# Patient Record
Sex: FEMALE | Race: WHITE | NOT HISPANIC OR LATINO | Employment: FULL TIME | RURAL
[De-identification: names, ages, dates, MRNs, and addresses within clinical notes are randomized per-mention and may not be internally consistent; named-entity substitution may affect disease eponyms.]

---

## 2017-02-28 ENCOUNTER — TRANSCRIBE ORDERS (OUTPATIENT)
Dept: FAMILY MEDICINE CLINIC | Facility: CLINIC | Age: 57
End: 2017-02-28

## 2017-02-28 DIAGNOSIS — Z12.31 VISIT FOR SCREENING MAMMOGRAM: Primary | ICD-10-CM

## 2017-03-07 ENCOUNTER — OFFICE VISIT (OUTPATIENT)
Dept: FAMILY MEDICINE CLINIC | Facility: CLINIC | Age: 57
End: 2017-03-07

## 2017-03-07 VITALS
HEART RATE: 90 BPM | HEIGHT: 64 IN | OXYGEN SATURATION: 98 % | WEIGHT: 181.4 LBS | BODY MASS INDEX: 30.97 KG/M2 | SYSTOLIC BLOOD PRESSURE: 122 MMHG | TEMPERATURE: 98.2 F | DIASTOLIC BLOOD PRESSURE: 84 MMHG

## 2017-03-07 DIAGNOSIS — J01.00 ACUTE NON-RECURRENT MAXILLARY SINUSITIS: Primary | ICD-10-CM

## 2017-03-07 DIAGNOSIS — F41.8 MIXED ANXIETY DEPRESSIVE DISORDER: ICD-10-CM

## 2017-03-07 DIAGNOSIS — E55.9 VITAMIN D DEFICIENCY: ICD-10-CM

## 2017-03-07 DIAGNOSIS — Z23 NEED FOR TDAP VACCINATION: ICD-10-CM

## 2017-03-07 PROBLEM — B00.1 HERPES LABIALIS: Status: ACTIVE | Noted: 2017-03-07

## 2017-03-07 PROBLEM — R53.83 FATIGUE: Status: ACTIVE | Noted: 2017-03-07

## 2017-03-07 PROBLEM — M54.50 LOW BACK PAIN: Status: ACTIVE | Noted: 2017-03-07

## 2017-03-07 PROBLEM — N64.4 BREAST PAIN: Status: ACTIVE | Noted: 2017-03-07

## 2017-03-07 PROBLEM — R07.2 PRECORDIAL PAIN: Status: ACTIVE | Noted: 2017-03-07

## 2017-03-07 PROCEDURE — 99213 OFFICE O/P EST LOW 20 MIN: CPT | Performed by: NURSE PRACTITIONER

## 2017-03-07 RX ORDER — ERGOCALCIFEROL 1.25 MG/1
CAPSULE ORAL
COMMUNITY
Start: 2015-07-22 | End: 2017-03-07

## 2017-03-07 RX ORDER — AMOXICILLIN AND CLAVULANATE POTASSIUM 875; 125 MG/1; MG/1
1 TABLET, FILM COATED ORAL 2 TIMES DAILY
Qty: 20 TABLET | Refills: 0 | Status: SHIPPED | OUTPATIENT
Start: 2017-03-07 | End: 2017-05-23

## 2017-03-07 RX ORDER — IBUPROFEN 800 MG/1
800 TABLET ORAL EVERY 6 HOURS PRN
COMMUNITY
Start: 2016-12-23 | End: 2017-09-14

## 2017-03-07 RX ORDER — FLUCONAZOLE 150 MG/1
150 TABLET ORAL ONCE
Qty: 2 TABLET | Refills: 0 | Status: SHIPPED | OUTPATIENT
Start: 2017-03-07 | End: 2017-03-07

## 2017-03-07 NOTE — PROGRESS NOTES
"Joe Keating is a 56 y.o. female.   Chief Complaint   Patient presents with   • URI     URI    This is a new problem. The current episode started 1 to 4 weeks ago. The problem has been gradually worsening. Associated symptoms include congestion, coughing, headaches, nausea, rhinorrhea and sinus pain. Pertinent negatives include no abdominal pain, chest pain, diarrhea, ear pain, rash, sneezing, sore throat, swollen glands, vomiting or wheezing. She has tried nothing for the symptoms. The treatment provided no relief.        The following portions of the patient's history were reviewed and updated as appropriate: allergies, current medications, past family history, past medical history, past social history, past surgical history and problem list.  Visit Vitals   • /84 (BP Location: Right arm, Patient Position: Sitting)   • Pulse 90   • Temp 98.2 °F (36.8 °C) (Oral)   • Ht 64\" (162.6 cm)   • Wt 181 lb 6.4 oz (82.3 kg)   • SpO2 98%  Comment: Room air   • BMI 31.14 kg/m2     Review of Systems   Constitutional: Negative for chills, fatigue and fever.   HENT: Positive for congestion and rhinorrhea. Negative for ear pain, sneezing and sore throat.    Respiratory: Positive for cough. Negative for shortness of breath and wheezing.    Cardiovascular: Negative for chest pain, palpitations and leg swelling.   Gastrointestinal: Positive for nausea. Negative for abdominal pain, diarrhea and vomiting.   Musculoskeletal: Negative for back pain and myalgias.   Skin: Negative for rash and wound.   Neurological: Positive for headaches. Negative for dizziness and light-headedness.   Psychiatric/Behavioral: Negative for sleep disturbance. The patient is not nervous/anxious.        Objective   Physical Exam   Constitutional: She is oriented to person, place, and time. She appears well-developed and well-nourished.   HENT:   Head: Normocephalic and atraumatic.   Right Ear: External ear normal.   Left Ear: External ear " normal.   Oropharynx PND noted  Nasal turbinates erythematous and edematous   Eyes: Pupils are equal, round, and reactive to light.   Cardiovascular: Normal rate, regular rhythm and normal heart sounds.    Pulmonary/Chest: Effort normal and breath sounds normal.   Abdominal: Soft. Bowel sounds are normal.   Musculoskeletal: Normal range of motion.   Neurological: She is alert and oriented to person, place, and time.   Skin: Skin is warm and dry.   Psychiatric: She has a normal mood and affect. Her behavior is normal.       Assessment/Plan   Zuleyma was seen today for uri.    Diagnoses and all orders for this visit:    Acute non-recurrent maxillary sinusitis  -     amoxicillin-clavulanate (AUGMENTIN) 875-125 MG per tablet; Take 1 tablet by mouth 2 (Two) Times a Day.  -     fluconazole (DIFLUCAN) 150 MG tablet; Take 1 tablet by mouth 1 (One) Time for 1 dose.      Symptoms have persisted 2 weeks despite supportive treatment. Will order Augmentin, administration and SE discussed. She does typically develop vaginitis with antibiotic use. Will order Diflucan. Eat a yogurt daily with medication. Stay hydrated. Follow up in 2-3 days if no improvement or if symptoms worsen.  I have also encouraged her to return for fasting labs and Tdap vaccine in the near future. She voices understanding.

## 2017-03-13 ENCOUNTER — APPOINTMENT (OUTPATIENT)
Dept: MAMMOGRAPHY | Facility: HOSPITAL | Age: 57
End: 2017-03-13

## 2017-03-14 ENCOUNTER — HOSPITAL ENCOUNTER (OUTPATIENT)
Dept: MAMMOGRAPHY | Facility: HOSPITAL | Age: 57
Discharge: HOME | End: 2017-03-14
Admitting: NURSE PRACTITIONER

## 2017-03-14 ENCOUNTER — OFFICE VISIT (OUTPATIENT)
Dept: OBSTETRICS AND GYNECOLOGY | Facility: CLINIC | Age: 57
End: 2017-03-14

## 2017-03-14 VITALS
HEIGHT: 64 IN | DIASTOLIC BLOOD PRESSURE: 76 MMHG | SYSTOLIC BLOOD PRESSURE: 112 MMHG | WEIGHT: 181.2 LBS | BODY MASS INDEX: 30.93 KG/M2

## 2017-03-14 DIAGNOSIS — Z01.411 ENCOUNTER FOR GYNECOLOGICAL EXAMINATION WITH ABNORMAL FINDING: ICD-10-CM

## 2017-03-14 DIAGNOSIS — Z12.31 VISIT FOR SCREENING MAMMOGRAM: ICD-10-CM

## 2017-03-14 DIAGNOSIS — Z78.0 MENOPAUSE: Primary | ICD-10-CM

## 2017-03-14 DIAGNOSIS — N81.4 UTERINE PROLAPSE: ICD-10-CM

## 2017-03-14 PROCEDURE — 77063 BREAST TOMOSYNTHESIS BI: CPT | Performed by: RADIOLOGY

## 2017-03-14 PROCEDURE — 77067 SCR MAMMO BI INCL CAD: CPT | Performed by: RADIOLOGY

## 2017-03-14 PROCEDURE — 99396 PREV VISIT EST AGE 40-64: CPT | Performed by: OBSTETRICS & GYNECOLOGY

## 2017-03-14 PROCEDURE — G0202 SCR MAMMO BI INCL CAD: HCPCS

## 2017-03-14 PROCEDURE — 77063 BREAST TOMOSYNTHESIS BI: CPT

## 2017-03-14 NOTE — PROGRESS NOTES
"   Chief Complaint   Patient presents with   • Gynecologic Exam     concerned about uterine prolapse   • Urinary Frequency       Zuleyma Keating is a 56 y.o. year old  presenting to be seen for her annual wellness visit.  She has been followed in the past by Dr. Adán Ballard.  He diagnosed grade 1 uterine prolapse in .  She has recently had severe coughing and feels that her prolapse has worsened with increased pelvic pressure.  She denies bulging from the vagina.  She denies urinary incontinence or bowel related symptoms.  She had spotting 8 months ago but has had no bleeding since.    SCREENING TESTS    Year 2012   Age                         PAP    Neg.                     HPV high risk                         Mammogram     benign                    HUMAIRA score                         Breast MRI                         Lipids                         Vitamin D                         Colonoscopy                         DEXA  Frax (hip/any)                         Ovarian Screen                           Enter the month test was performed.  If month not known, enter \"X'  · Black numbers = normal results  · Red numbers = abnormal results  · Black X = patient reported normal  · Red X - patient reported abnormal      Referred by:    Profession:    Other info:         History   Sexual Activity   • Sexual activity: Yes   • Partners: Male   • Birth control/ protection: Post-menopausal    She would not like to be screened for STD's at today's exam.     She exercises regularly: yes.  She wears her seat belt: yes.  She has concerns about domestic violence: no.  She has noticed changes in height: no    GYN screening history:  · Last mammogram: was done on approximately 2016 and the result was: Birads II (Benign findings)..    No Additional Complaints Reported    The following portions of the patient's history were " "reviewed and updated as appropriate:vital signs and   She  does not have any pertinent problems on file.  She  has a past surgical history that includes Back surgery and Breast biopsy (06/05/2012).  Her family history includes Breast cancer in her mother.  She  reports that she has never smoked. She does not have any smokeless tobacco history on file. She reports that she drinks alcohol. She reports that she does not use illicit drugs.  Current Outpatient Prescriptions   Medication Sig Dispense Refill   • amoxicillin-clavulanate (AUGMENTIN) 875-125 MG per tablet Take 1 tablet by mouth 2 (Two) Times a Day. 20 tablet 0   • buPROPion SR (WELLBUTRIN SR) 150 MG 12 hr tablet Take 1 tablet by mouth 2 (two) times a day. 60 tablet 5   • ibuprofen (ADVIL,MOTRIN) 800 MG tablet Take 800 mg by mouth Every 6 (Six) Hours As Needed.     • valACYclovir (VALTREX) 1000 MG tablet Take 2 tablets every 12 hours for one day 4 tablet 2     No current facility-administered medications for this visit.      She has No Known Allergies..    Review of Systems  A comprehensive review of systems was negative.  Constitutional: negative for fever, chills, activity change, appetite change, fatigue and unexpected weight change.  Respiratory: negative  Cardiovascular: negative  Gastrointestinal: negative  Genitourinary:negative  Musculoskeletal:negative  Behavioral/Psych: negative       Visit Vitals   • /76   • Ht 64\" (162.6 cm)   • Wt 181 lb 3.2 oz (82.2 kg)   • LMP  (LMP Unknown)   • BMI 31.1 kg/m2       Physical Exam    General:  alert; cooperative; well developed; well nourished   Skin:  No suspicious lesions seen   Thyroid: normal to inspection and palpation   Lungs:  clear to auscultation bilaterally   Heart:  regular rate and rhythm, S1, S2 normal, no murmur, click, rub or gallop   Breasts:  Examined in supine position  Symmetric without masses or skin dimpling  Nipples normal without inversion, lesions or discharge  There are no palpable " axillary nodes   Abdomen: soft, non-tender; no masses  no umbilical or inginual hernias are present  no hepato-splenomegaly   Pelvis: Clinical staff was present for exam  External genitalia:  normal appearance of the external genitalia including Bartholin's and [place]'s glands.  Vaginal:  normal pink mucosa without prolapse or lesions.  Cervix:  normal appearance. large  Uterus:  normal size, shape and consistency. grade II prolapse  Adnexa:  non palpable bilaterally.  Rectal:  anus visually normal appearing. recto-vaginal exam unremarkable and confirms findings;     Lab Review   No data reviewed    Imaging  Mammogram results         ASSESSMENT  Problems Addressed this Visit        Genitourinary    Uterine prolapse    Menopause - Primary      Other Visit Diagnoses     Encounter for gynecological examination with abnormal finding        Relevant Orders    Liquid-based Pap Smear, Screening          PLAN    · Medications prescribed this encounter:  No orders of the defined types were placed in this encounter.  · Pap test done  · The patient was given pamphlets about uterine prolapse and about robotic hysterectomy.  · Calcium, 600 mg/ Vit. D, 400 IU daily; regular weight-bearing exercise  · Follow up: PRN she will call if she desires a robotically-assisted TLH possible BSO with VCS.  *Please note that portions of this documentation may have been completed with a voice recognition program.  Efforts were made to edit this dictation, but occasional words may have been mistranscribed.       This note was electronically signed.    JOHANNY Lema MD  March 14, 2017  2:20 PM

## 2017-03-15 ENCOUNTER — TELEPHONE (OUTPATIENT)
Dept: OBSTETRICS AND GYNECOLOGY | Facility: CLINIC | Age: 57
End: 2017-03-15

## 2017-03-15 NOTE — TELEPHONE ENCOUNTER
----- Message from Cassidy Rogers sent at 3/15/2017 10:33 AM EDT -----  Please call pt [phone]      03/15/17 11:33 Former Dr. Ballard pt now seeing Dr. Lema. She had questions about Dr. Ballard.

## 2017-05-12 ENCOUNTER — LAB (OUTPATIENT)
Dept: FAMILY MEDICINE CLINIC | Facility: CLINIC | Age: 57
End: 2017-05-12

## 2017-05-12 DIAGNOSIS — E55.9 VITAMIN D DEFICIENCY: ICD-10-CM

## 2017-05-12 DIAGNOSIS — F41.8 MIXED ANXIETY DEPRESSIVE DISORDER: ICD-10-CM

## 2017-05-12 LAB
25(OH)D3 SERPL-MCNC: 24 NG/ML
ALBUMIN SERPL-MCNC: 4.2 G/DL (ref 3.5–5)
ALBUMIN/GLOB SERPL: 1.3 G/DL (ref 1.5–2.5)
ALP SERPL-CCNC: 130 U/L (ref 35–104)
ALT SERPL W P-5'-P-CCNC: 22 U/L (ref 10–36)
ANION GAP SERPL CALCULATED.3IONS-SCNC: 0.9 MMOL/L (ref 3.6–11.2)
AST SERPL-CCNC: 17 U/L (ref 10–30)
BASOPHILS # BLD AUTO: 0.05 10*3/MM3 (ref 0–0.3)
BASOPHILS NFR BLD AUTO: 0.9 % (ref 0–2)
BILIRUB SERPL-MCNC: 0.4 MG/DL (ref 0.2–1.8)
BUN BLD-MCNC: 11 MG/DL (ref 7–21)
BUN/CREAT SERPL: 15.3 (ref 7–25)
CALCIUM SPEC-SCNC: 9.7 MG/DL (ref 7.7–10)
CHLORIDE SERPL-SCNC: 108 MMOL/L (ref 99–112)
CHOLEST SERPL-MCNC: 196 MG/DL (ref 0–200)
CO2 SERPL-SCNC: 32.1 MMOL/L (ref 24.3–31.9)
CREAT BLD-MCNC: 0.72 MG/DL (ref 0.43–1.29)
DEPRECATED RDW RBC AUTO: 45.1 FL (ref 37–54)
EOSINOPHIL # BLD AUTO: 0.18 10*3/MM3 (ref 0–0.7)
EOSINOPHIL NFR BLD AUTO: 3.3 % (ref 0–5)
ERYTHROCYTE [DISTWIDTH] IN BLOOD BY AUTOMATED COUNT: 14.1 % (ref 11.5–14.5)
GFR SERPL CREATININE-BSD FRML MDRD: 84 ML/MIN/1.73
GLOBULIN UR ELPH-MCNC: 3.2 GM/DL
GLUCOSE BLD-MCNC: 104 MG/DL (ref 70–110)
HCT VFR BLD AUTO: 44 % (ref 37–47)
HDLC SERPL-MCNC: 50 MG/DL (ref 60–100)
HGB BLD-MCNC: 14.4 G/DL (ref 12–16)
IMM GRANULOCYTES # BLD: 0.02 10*3/MM3 (ref 0–0.03)
IMM GRANULOCYTES NFR BLD: 0.4 % (ref 0–0.5)
LDLC SERPL CALC-MCNC: 116 MG/DL (ref 0–100)
LDLC/HDLC SERPL: 2.33 {RATIO}
LYMPHOCYTES # BLD AUTO: 1.29 10*3/MM3 (ref 1–3)
LYMPHOCYTES NFR BLD AUTO: 23.5 % (ref 21–51)
MCH RBC QN AUTO: 29.4 PG (ref 27–33)
MCHC RBC AUTO-ENTMCNC: 32.7 G/DL (ref 33–37)
MCV RBC AUTO: 90 FL (ref 80–94)
MONOCYTES # BLD AUTO: 0.42 10*3/MM3 (ref 0.1–0.9)
MONOCYTES NFR BLD AUTO: 7.7 % (ref 0–10)
NEUTROPHILS # BLD AUTO: 3.53 10*3/MM3 (ref 1.4–6.5)
NEUTROPHILS NFR BLD AUTO: 64.2 % (ref 30–70)
OSMOLALITY SERPL CALC.SUM OF ELEC: 281 MOSM/KG (ref 273–305)
PLATELET # BLD AUTO: 350 10*3/MM3 (ref 130–400)
PMV BLD AUTO: 8.5 FL (ref 6–10)
POTASSIUM BLD-SCNC: 4.3 MMOL/L (ref 3.5–5.3)
PROT SERPL-MCNC: 7.4 G/DL (ref 6–8)
RBC # BLD AUTO: 4.89 10*6/MM3 (ref 4.2–5.4)
SODIUM BLD-SCNC: 141 MMOL/L (ref 135–153)
TRIGL SERPL-MCNC: 148 MG/DL (ref 0–150)
TSH SERPL DL<=0.05 MIU/L-ACNC: 4.28 MIU/ML (ref 0.55–4.78)
VLDLC SERPL-MCNC: 29.6 MG/DL
WBC NRBC COR # BLD: 5.49 10*3/MM3 (ref 4.5–12.5)

## 2017-05-12 PROCEDURE — 84443 ASSAY THYROID STIM HORMONE: CPT | Performed by: NURSE PRACTITIONER

## 2017-05-12 PROCEDURE — 85025 COMPLETE CBC W/AUTO DIFF WBC: CPT | Performed by: NURSE PRACTITIONER

## 2017-05-12 PROCEDURE — 36415 COLL VENOUS BLD VENIPUNCTURE: CPT | Performed by: FAMILY MEDICINE

## 2017-05-12 PROCEDURE — 80061 LIPID PANEL: CPT | Performed by: NURSE PRACTITIONER

## 2017-05-12 PROCEDURE — 82306 VITAMIN D 25 HYDROXY: CPT | Performed by: NURSE PRACTITIONER

## 2017-05-12 PROCEDURE — 80053 COMPREHEN METABOLIC PANEL: CPT | Performed by: NURSE PRACTITIONER

## 2017-05-15 RX ORDER — ERGOCALCIFEROL 1.25 MG/1
50000 CAPSULE ORAL WEEKLY
Qty: 4 CAPSULE | Refills: 5 | Status: SHIPPED | OUTPATIENT
Start: 2017-05-15 | End: 2017-09-14

## 2017-05-15 RX ORDER — BUPROPION HYDROCHLORIDE 150 MG/1
150 TABLET, EXTENDED RELEASE ORAL 2 TIMES DAILY
Qty: 60 TABLET | Refills: 5 | Status: SHIPPED | OUTPATIENT
Start: 2017-05-15 | End: 2017-08-28 | Stop reason: SDUPTHER

## 2017-05-23 ENCOUNTER — OFFICE VISIT (OUTPATIENT)
Dept: FAMILY MEDICINE CLINIC | Facility: CLINIC | Age: 57
End: 2017-05-23

## 2017-05-23 VITALS
HEART RATE: 102 BPM | SYSTOLIC BLOOD PRESSURE: 110 MMHG | DIASTOLIC BLOOD PRESSURE: 78 MMHG | BODY MASS INDEX: 30.66 KG/M2 | WEIGHT: 179.6 LBS | TEMPERATURE: 97.9 F | HEIGHT: 64 IN

## 2017-05-23 DIAGNOSIS — J06.9 ACUTE URI: ICD-10-CM

## 2017-05-23 DIAGNOSIS — M25.552 LEFT HIP PAIN: ICD-10-CM

## 2017-05-23 DIAGNOSIS — M54.42 ACUTE MIDLINE LOW BACK PAIN WITH LEFT-SIDED SCIATICA: Primary | ICD-10-CM

## 2017-05-23 PROCEDURE — 99213 OFFICE O/P EST LOW 20 MIN: CPT | Performed by: NURSE PRACTITIONER

## 2017-05-23 RX ORDER — PREDNISONE 10 MG/1
TABLET ORAL
Qty: 21 EACH | Refills: 0 | Status: SHIPPED | OUTPATIENT
Start: 2017-05-23 | End: 2017-06-06

## 2017-05-23 RX ORDER — METHOCARBAMOL 500 MG/1
500 TABLET, FILM COATED ORAL 3 TIMES DAILY
Qty: 21 TABLET | Refills: 0 | Status: SHIPPED | OUTPATIENT
Start: 2017-05-23 | End: 2017-06-06

## 2017-06-05 ENCOUNTER — HOSPITAL ENCOUNTER (OUTPATIENT)
Dept: GENERAL RADIOLOGY | Facility: HOSPITAL | Age: 57
Discharge: HOME | End: 2017-06-05
Admitting: NURSE PRACTITIONER

## 2017-06-05 DIAGNOSIS — M25.552 LEFT HIP PAIN: ICD-10-CM

## 2017-06-05 DIAGNOSIS — M54.42 ACUTE MIDLINE LOW BACK PAIN WITH LEFT-SIDED SCIATICA: ICD-10-CM

## 2017-06-05 PROCEDURE — 73502 X-RAY EXAM HIP UNI 2-3 VIEWS: CPT

## 2017-06-05 PROCEDURE — 73502 X-RAY EXAM HIP UNI 2-3 VIEWS: CPT | Performed by: RADIOLOGY

## 2017-06-05 PROCEDURE — 72110 X-RAY EXAM L-2 SPINE 4/>VWS: CPT

## 2017-06-05 PROCEDURE — 72110 X-RAY EXAM L-2 SPINE 4/>VWS: CPT | Performed by: RADIOLOGY

## 2017-06-06 ENCOUNTER — OFFICE VISIT (OUTPATIENT)
Dept: FAMILY MEDICINE CLINIC | Facility: CLINIC | Age: 57
End: 2017-06-06

## 2017-06-06 VITALS
WEIGHT: 175.4 LBS | DIASTOLIC BLOOD PRESSURE: 72 MMHG | HEART RATE: 81 BPM | SYSTOLIC BLOOD PRESSURE: 109 MMHG | TEMPERATURE: 97.8 F | HEIGHT: 64 IN | BODY MASS INDEX: 29.94 KG/M2

## 2017-06-06 DIAGNOSIS — M54.42 ACUTE LEFT-SIDED LOW BACK PAIN WITH LEFT-SIDED SCIATICA: Primary | ICD-10-CM

## 2017-06-06 DIAGNOSIS — Z98.890 HISTORY OF LUMBAR SURGERY: ICD-10-CM

## 2017-06-06 DIAGNOSIS — M25.552 LEFT HIP PAIN: ICD-10-CM

## 2017-06-06 PROCEDURE — 99213 OFFICE O/P EST LOW 20 MIN: CPT | Performed by: NURSE PRACTITIONER

## 2017-06-06 RX ORDER — TIZANIDINE 4 MG/1
4 TABLET ORAL NIGHTLY PRN
Qty: 30 TABLET | Refills: 0 | Status: SHIPPED | OUTPATIENT
Start: 2017-06-06 | End: 2017-09-14

## 2017-06-06 NOTE — PATIENT INSTRUCTIONS
Sciatica  Sciatica is pain, numbness, weakness, or tingling along the path of the sciatic nerve. The sciatic nerve starts in the lower back and runs down the back of each leg. The nerve controls the muscles in the lower leg and in the back of the knee. It also provides feeling (sensation) to the back of the thigh, the lower leg, and the sole of the foot. Sciatica is a symptom of another medical condition that pinches or puts pressure on the sciatic nerve.  Generally, sciatica only affects one side of the body. Sciatica usually goes away on its own or with treatment. In some cases, sciatica may keep coming back (recur).  CAUSES  This condition is caused by pressure on the sciatic nerve, or pinching of the sciatic nerve. This may be the result of:  · A disk in between the bones of the spine (vertebrae) bulging out too far (herniated disk).  · Age-related changes in the spinal disks (degenerative disk disease).  · A pain disorder that affects a muscle in the buttock (piriformis syndrome).  · Extra bone growth (bone spur) near the sciatic nerve.  · An injury or break (fracture) of the pelvis.  · Pregnancy.  · Tumor (rare).  RISK FACTORS  The following factors may make you more likely to develop this condition:  · Playing sports that place pressure or stress on the spine, such as football or weight lifting.  · Having poor strength and flexibility.  · A history of back injury.  · A history of back surgery.  · Sitting for long periods of time.  · Doing activities that involve repetitive bending or lifting.  · Obesity.  SYMPTOMS  Symptoms can vary from mild to very severe, and they may include:  · Any of these problems in the lower back, leg, hip, or buttock:    Mild tingling or dull aches.    Burning sensations.    Sharp pains.  · Numbness in the back of the calf or the sole of the foot.  · Leg weakness.  · Severe back pain that makes movement difficult.  These symptoms may get worse when you cough, sneeze, or laugh, or  when you sit or stand for long periods of time. Being overweight may also make symptoms worse. In some cases, symptoms may recur over time.  DIAGNOSIS  This condition may be diagnosed based on:  · Your symptoms.  · A physical exam. Your health care provider may ask you to do certain movements to check whether those movements trigger your symptoms.  · You may have tests, including:    Blood tests.    X-rays.    MRI.    CT scan.  TREATMENT  In many cases, this condition improves on its own, without any treatment. However, treatment may include:  · Reducing or modifying physical activity during periods of pain.  · Exercising and stretching to strengthen your abdomen and improve the flexibility of your spine.  · Icing and applying heat to the affected area.  · Medicines that help:    To relieve pain and swelling.    To relax your muscles.  · Injections of medicines that help to relieve pain, irritation, and inflammation around the sciatic nerve (steroids).  · Surgery.  HOME CARE INSTRUCTIONS  Medicines  · Take over-the-counter and prescription medicines only as told by your health care provider.  · Do not drive or operate heavy machinery while taking prescription pain medicine.  Managing Pain  · If directed, apply ice to the affected area.    Put ice in a plastic bag.    Place a towel between your skin and the bag.    Leave the ice on for 20 minutes, 2-3 times a day.  · After icing, apply heat to the affected area before you exercise or as often as told by your health care provider. Use the heat source that your health care provider recommends, such as a moist heat pack or a heating pad.    Place a towel between your skin and the heat source.    Leave the heat on for 20-30 minutes.    Remove the heat if your skin turns bright red. This is especially important if you are unable to feel pain, heat, or cold. You may have a greater risk of getting burned.  Activity  · Return to your normal activities as told by your health  care provider. Ask your health care provider what activities are safe for you.    Avoid activities that make your symptoms worse.  · Take brief periods of rest throughout the day. Resting in a lying or standing position is usually better than sitting to rest.    When you rest for longer periods, mix in some mild activity or stretching between periods of rest. This will help to prevent stiffness and pain.    Avoid sitting for long periods of time without moving. Get up and move around at least one time each hour.  · Exercise and stretch regularly, as told by your health care provider.  · Do not lift anything that is heavier than 10 lb (4.5 kg) while you have symptoms of sciatica. When you do not have symptoms, you should still avoid heavy lifting, especially repetitive heavy lifting.  · When you lift objects, always use proper lifting technique, which includes:    Bending your knees.    Keeping the load close to your body.    Avoiding twisting.  General Instructions   · Use good posture.    Avoid leaning forward while sitting.    Avoid hunching over while standing.  · Maintain a healthy weight. Excess weight puts extra stress on your back and makes it difficult to maintain good posture.  · Wear supportive, comfortable shoes. Avoid wearing high heels.  · Avoid sleeping on a mattress that is too soft or too hard. A mattress that is firm enough to support your back when you sleep may help to reduce your pain.  · Keep all follow-up visits as told by your health care provider. This is important.  SEEK MEDICAL CARE IF:  · You have pain that wakes you up when you are sleeping.  · You have pain that gets worse when you lie down.  · Your pain is worse than you have experienced in the past.  · Your pain lasts longer than 4 weeks.  · You experience unexplained weight loss.  SEEK IMMEDIATE MEDICAL CARE IF:  · You lose control of your bowel or bladder (incontinence).  · You have:    Weakness in your lower back, pelvis, buttocks,  or legs that gets worse.    Redness or swelling of your back.    A burning sensation when you urinate.     This information is not intended to replace advice given to you by your health care provider. Make sure you discuss any questions you have with your health care provider.     Document Released: 12/12/2002 Document Revised: 04/10/2017 Document Reviewed: 08/26/2016  [employer] Patient Education ©2017 [employer]

## 2017-06-06 NOTE — PROGRESS NOTES
"Joe Keating is a 56 y.o. female.   Chief Complaint   Patient presents with   • Hip Pain     History of Present Illness     The patient presents today for follow-up regarding low back and left hip pain.  She did complete the course of steroids.  Is using the muscle relaxant a few times daily.  Has had some mild relief in symptoms.  Pain is worse when moving from a seated to standing position.  Also worse when sitting for prolonged periods.  She has had some paresthesias and cramping into the left thigh.  Has had no numbness or pain in the lower leg or toes.  Has had some left leg weakness. No bowel or bladder changes. She does have a history of lumbar spine surgery on L4-L5 about 5 years ago.  She does have hardware in place.  Has recently had lumbar spine and left hip x-ray.  Left hip x-ray showed some mild arthritis changes.  Lumbar spine x-ray showed hardware in place with disc desiccation at L5-S1. Overall, this is persistent.     The following portions of the patient's history were reviewed and updated as appropriate: allergies, current medications, past family history, past medical history, past social history, past surgical history and problem list.  /72 (BP Location: Left arm, Patient Position: Sitting)  Pulse 81  Temp 97.8 °F (36.6 °C) (Oral)   Ht 64\" (162.6 cm)  Wt 175 lb 6.4 oz (79.6 kg)  LMP  (LMP Unknown)  BMI 30.11 kg/m2  Review of Systems   Constitutional: Negative for chills, fatigue and fever.   HENT: Negative for congestion, ear pain, rhinorrhea and sore throat.    Respiratory: Negative for cough, shortness of breath and wheezing.    Cardiovascular: Negative for chest pain, palpitations and leg swelling.   Gastrointestinal: Negative for abdominal pain, diarrhea, nausea and vomiting.   Genitourinary: Negative for dysuria.   Musculoskeletal: Positive for arthralgias and back pain. Negative for myalgias.   Skin: Negative for rash and wound.   Neurological: Negative for " dizziness, light-headedness and headaches.   Psychiatric/Behavioral: Negative for sleep disturbance. The patient is not nervous/anxious.        Objective   Physical Exam   Constitutional: She is oriented to person, place, and time. She appears well-developed and well-nourished.   HENT:   Head: Normocephalic and atraumatic.   Cardiovascular: Normal rate, regular rhythm and normal heart sounds.    Pulmonary/Chest: Effort normal and breath sounds normal.   Abdominal: Soft. Bowel sounds are normal.   Musculoskeletal:   Left lumbar paraspinal and gluteal tenderness to palpation. No spasm. Full ROM LS.   Left hip nontender to palpation  Full ROM left hip no pain   Neurological: She is alert and oriented to person, place, and time. She has normal reflexes.   SLR negative   Skin: Skin is warm and dry.   Psychiatric: She has a normal mood and affect. Her behavior is normal.       Assessment/Plan   Zuleyma was seen today for hip pain.    Diagnoses and all orders for this visit:    Acute left-sided low back pain with left-sided sciatica  -     tiZANidine (ZANAFLEX) 4 MG tablet; Take 1 tablet by mouth At Night As Needed for Muscle Spasms.  -     MRI Lumbar Spine Without Contrast; Future    Left hip pain  -     MRI Lumbar Spine Without Contrast; Future    History of lumbar surgery  -     MRI Lumbar Spine Without Contrast; Future        Will order an MRI of the lumbar spine. Stop Robaxin, start Zanaflex when needed. This may cause drowsiness. Try to avoid aggravating activities. Will consider course of PT after MRI. Follow up pending MRI.

## 2017-06-15 ENCOUNTER — HOSPITAL ENCOUNTER (OUTPATIENT)
Dept: MRI IMAGING | Facility: HOSPITAL | Age: 57
Discharge: HOME | End: 2017-06-15
Admitting: NURSE PRACTITIONER

## 2017-06-15 DIAGNOSIS — M54.42 ACUTE LEFT-SIDED LOW BACK PAIN WITH LEFT-SIDED SCIATICA: ICD-10-CM

## 2017-06-15 DIAGNOSIS — M25.552 LEFT HIP PAIN: ICD-10-CM

## 2017-06-15 DIAGNOSIS — Z98.890 HISTORY OF LUMBAR SURGERY: ICD-10-CM

## 2017-06-15 PROCEDURE — 72148 MRI LUMBAR SPINE W/O DYE: CPT

## 2017-06-15 PROCEDURE — 72148 MRI LUMBAR SPINE W/O DYE: CPT | Performed by: RADIOLOGY

## 2017-06-16 DIAGNOSIS — M25.552 LEFT HIP PAIN: Primary | ICD-10-CM

## 2017-06-27 DIAGNOSIS — M54.42 ACUTE LEFT-SIDED LOW BACK PAIN WITH LEFT-SIDED SCIATICA: ICD-10-CM

## 2017-06-27 RX ORDER — TIZANIDINE 4 MG/1
TABLET ORAL
Qty: 30 TABLET | Refills: 0 | OUTPATIENT
Start: 2017-06-27

## 2017-08-09 ENCOUNTER — OFFICE VISIT (OUTPATIENT)
Dept: FAMILY MEDICINE CLINIC | Facility: CLINIC | Age: 57
End: 2017-08-09

## 2017-08-09 VITALS
TEMPERATURE: 97.3 F | HEART RATE: 85 BPM | SYSTOLIC BLOOD PRESSURE: 111 MMHG | HEIGHT: 64 IN | WEIGHT: 166 LBS | DIASTOLIC BLOOD PRESSURE: 77 MMHG | BODY MASS INDEX: 28.34 KG/M2

## 2017-08-09 DIAGNOSIS — S91.011A: Primary | ICD-10-CM

## 2017-08-09 PROCEDURE — 12002 RPR S/N/AX/GEN/TRNK2.6-7.5CM: CPT | Performed by: FAMILY MEDICINE

## 2017-08-09 PROCEDURE — 90471 IMMUNIZATION ADMIN: CPT | Performed by: FAMILY MEDICINE

## 2017-08-09 PROCEDURE — 90715 TDAP VACCINE 7 YRS/> IM: CPT | Performed by: FAMILY MEDICINE

## 2017-08-09 RX ORDER — CEPHALEXIN 500 MG/1
500 CAPSULE ORAL 3 TIMES DAILY
Qty: 9 CAPSULE | Refills: 0 | Status: SHIPPED | OUTPATIENT
Start: 2017-08-09 | End: 2017-08-17 | Stop reason: SDUPTHER

## 2017-08-09 NOTE — PROGRESS NOTES
"Subjective   Zuleyma Ketaing is a 56 y.o. female.     History of Present Illness about 15 hours ago had injury to right medial ankle with a cut.  Struck self with door.  No other injuries.  Has been unable to completely stop the bleeding.    The following portions of the patient's history were reviewed and updated as appropriate: allergies, past family history, past medical history, past social history, past surgical history and problem list.    Review of Systems see the history of present illness    Objective   Physical Exam   Constitutional: She is oriented to person, place, and time. She appears well-developed and well-nourished.   Neurological: She is alert and oriented to person, place, and time.   Skin:   Has a 5 cm laceration right ankle just distal to the medial malleolus.  A slight degree of flap formation   Vitals reviewed.    /77 (BP Location: Left arm, Patient Position: Sitting, Cuff Size: Adult)  Pulse 85  Temp 97.3 °F (36.3 °C) (Oral)   Ht 64\" (162.6 cm)  Wt 166 lb (75.3 kg)  LMP  (LMP Unknown)  BMI 28.49 kg/m2  Assessment/Plan   Zuleyma was seen today for foot injury.    Diagnoses and all orders for this visit:    Laceration of ankle without complication, right, initial encounter    Other orders  -     Tdap Vaccine Greater Than or Equal To 8yo IM  -     cephalexin (KEFLEX) 500 MG capsule; Take 1 capsule by mouth 3 (Three) Times a Day.      After consent used 1% Xylocaine with epi local infiltration.  Aseptically repared the laceration with 5-0 Ethilon.  #8 sutures placed.  Somewhat crescent shaped.  Good closure and hemostasis.  Tolerated well.  Dressing applied.  Wound care instructions given to clean daily with peroxide rinse off with saline.  Do not apply ointments.  A few days of antibiotic authorized.  Suture removal in 10 days.  Limit activity.  See us in the interim if needed.         "

## 2017-08-17 ENCOUNTER — TELEPHONE (OUTPATIENT)
Dept: FAMILY MEDICINE CLINIC | Facility: CLINIC | Age: 57
End: 2017-08-17

## 2017-08-17 RX ORDER — CEPHALEXIN 500 MG/1
500 CAPSULE ORAL 3 TIMES DAILY
Qty: 15 CAPSULE | Refills: 0 | Status: SHIPPED | OUTPATIENT
Start: 2017-08-17 | End: 2017-09-14

## 2017-08-17 NOTE — TELEPHONE ENCOUNTER
CHRIS ANKLE LOOKS LIKE IT IS INFECTED . CAN YOU SEND HER IN A ANTI BIOTIC FOR IT.  [facility]   [phone]

## 2017-08-28 RX ORDER — BUPROPION HYDROCHLORIDE 150 MG/1
150 TABLET, EXTENDED RELEASE ORAL 2 TIMES DAILY
Qty: 60 TABLET | Refills: 5 | Status: SHIPPED | OUTPATIENT
Start: 2017-08-28 | End: 2018-04-17 | Stop reason: SDUPTHER

## 2017-08-28 RX ORDER — VALACYCLOVIR HYDROCHLORIDE 1 G/1
TABLET, FILM COATED ORAL
Qty: 4 TABLET | Refills: 2 | Status: SHIPPED | OUTPATIENT
Start: 2017-08-28 | End: 2018-04-25 | Stop reason: SDUPTHER

## 2017-09-13 PROBLEM — Z01.419 WELL WOMAN EXAM: Status: ACTIVE | Noted: 2017-09-13

## 2017-09-14 ENCOUNTER — OFFICE VISIT (OUTPATIENT)
Dept: OBSTETRICS AND GYNECOLOGY | Facility: CLINIC | Age: 57
End: 2017-09-14

## 2017-09-14 VITALS
BODY MASS INDEX: 27.49 KG/M2 | DIASTOLIC BLOOD PRESSURE: 68 MMHG | HEIGHT: 64 IN | SYSTOLIC BLOOD PRESSURE: 112 MMHG | RESPIRATION RATE: 14 BRPM | WEIGHT: 161 LBS

## 2017-09-14 DIAGNOSIS — N81.4 UTERINE PROLAPSE: ICD-10-CM

## 2017-09-14 DIAGNOSIS — N81.11 MIDLINE CYSTOCELE: Primary | ICD-10-CM

## 2017-09-14 PROBLEM — R07.2 PRECORDIAL PAIN: Status: RESOLVED | Noted: 2017-03-07 | Resolved: 2017-09-14

## 2017-09-14 PROBLEM — B00.1 HERPES LABIALIS: Status: RESOLVED | Noted: 2017-03-07 | Resolved: 2017-09-14

## 2017-09-14 PROBLEM — E55.9 VITAMIN D DEFICIENCY: Status: RESOLVED | Noted: 2017-03-07 | Resolved: 2017-09-14

## 2017-09-14 PROBLEM — R53.83 FATIGUE: Status: RESOLVED | Noted: 2017-03-07 | Resolved: 2017-09-14

## 2017-09-14 PROBLEM — N64.4 BREAST PAIN: Status: RESOLVED | Noted: 2017-03-07 | Resolved: 2017-09-14

## 2017-09-14 PROBLEM — M54.50 LOW BACK PAIN: Status: RESOLVED | Noted: 2017-03-07 | Resolved: 2017-09-14

## 2017-09-14 PROBLEM — F41.8 MIXED ANXIETY DEPRESSIVE DISORDER: Status: RESOLVED | Noted: 2017-03-07 | Resolved: 2017-09-14

## 2017-09-14 PROCEDURE — 99213 OFFICE O/P EST LOW 20 MIN: CPT | Performed by: OBSTETRICS & GYNECOLOGY

## 2017-09-14 NOTE — PROGRESS NOTES
"Subjective   Chief Complaint   Patient presents with   • [facility]     Zuleyma Keating is a 56 y.o. year old .  No LMP recorded (lmp unknown). Patient is postmenopausal.  She presents to be seen because of Concerns about prolapse.  She saw Dr. Lema the spring.  He told her surgery needs to be done.  For a variety of reasons she is choosing to change providers.  She currently is having no pain with the prolapse.  She empties her bladder without difficulty.  She is sexually active without discomfort.  She is no problems evacuating stool.  She does notice the prolapse a little bit more when she lifts her grandchild.  She is able to feel the prolapse with her finger when checking herself.     The following portions of the patient's history were reviewed and updated as appropriate:current medications and allergies    Smoking status: Never Smoker                                                              Smokeless status: Never Used                             Objective   /68  Resp 14  Ht 64\" (162.6 cm)  Wt 161 lb (73 kg)  LMP  (LMP Unknown)  Breastfeeding? No  BMI 27.64 kg/m2      Pelvis: Clinical staff was present for exam  External genitalia:  normal appearance of the external genitalia including Bartholin's and Cylinder's glands.  The inferior portion of the levators at the genital hiatus is somewhat gaping.  :  urethral meatus normal; urethra hypermobile;  Vaginal:  normal pink mucosa without prolapse or lesions.  Cervix:  normal appearance.  Uterus:  normal size, shape and consistency.  Adnexa:  non palpable bilaterally.  Rectal:  digital rectal exam not performed; anus visually normal appearing.  Cystocele GRADE 3  Rectocele GRADE 1  Uterine GRADE 3     Lab Review   No data reviewed    Imaging   No data reviewed        Assessment   1. Pelvic organ prolapse - as compared to the prior exams it is progressively worse.  It is still asymptomatic and not affecting her quality of life.     Plan "   1. Treatment options including expected management and surgical correction were reviewed.  Also, briefly discussed pessary use for symptomatic patients or sexual activity is not an option  2. Minimize lifting, constipation and obesity to prevent further prolapse.  3. Follow up for annual exam 3/2018        This note was electronically signed.    Giovanny Estes M.D.  September 14, 2017

## 2018-02-01 ENCOUNTER — TELEPHONE (OUTPATIENT)
Dept: FAMILY MEDICINE CLINIC | Facility: CLINIC | Age: 58
End: 2018-02-01

## 2018-02-01 RX ORDER — OSELTAMIVIR PHOSPHATE 75 MG/1
75 CAPSULE ORAL 2 TIMES DAILY
Qty: 10 CAPSULE | Refills: 0 | Status: SHIPPED | OUTPATIENT
Start: 2018-02-01 | End: 2018-05-07

## 2018-02-23 ENCOUNTER — TRANSCRIBE ORDERS (OUTPATIENT)
Dept: ADMINISTRATIVE | Facility: HOSPITAL | Age: 58
End: 2018-02-23

## 2018-02-23 DIAGNOSIS — Z12.31 VISIT FOR SCREENING MAMMOGRAM: Primary | ICD-10-CM

## 2018-02-28 ENCOUNTER — APPOINTMENT (OUTPATIENT)
Dept: MAMMOGRAPHY | Facility: HOSPITAL | Age: 58
End: 2018-02-28
Attending: OBSTETRICS & GYNECOLOGY

## 2018-03-01 ENCOUNTER — TELEPHONE (OUTPATIENT)
Dept: FAMILY MEDICINE CLINIC | Facility: CLINIC | Age: 58
End: 2018-03-01

## 2018-03-01 DIAGNOSIS — N64.4 BREAST PAIN, LEFT: Primary | ICD-10-CM

## 2018-03-01 DIAGNOSIS — Z12.31 ENCOUNTER FOR SCREENING MAMMOGRAM FOR BREAST CANCER: ICD-10-CM

## 2018-03-01 NOTE — TELEPHONE ENCOUNTER
PATIENT IS HAVING SOME PAIN IN THE LEFT BREAST. NEED A REFERRAL FOR DIAGNOSTIC MAMMO. GOES TO [facility] [phone]

## 2018-03-15 ENCOUNTER — TRANSCRIBE ORDERS (OUTPATIENT)
Dept: MAMMOGRAPHY | Facility: HOSPITAL | Age: 58
End: 2018-03-15

## 2018-03-15 ENCOUNTER — HOSPITAL ENCOUNTER (OUTPATIENT)
Dept: ULTRASOUND IMAGING | Facility: HOSPITAL | Age: 58
Discharge: HOME | End: 2018-03-15

## 2018-03-15 ENCOUNTER — HOSPITAL ENCOUNTER (OUTPATIENT)
Dept: MAMMOGRAPHY | Facility: HOSPITAL | Age: 58
Discharge: HOME | End: 2018-03-15
Admitting: NURSE PRACTITIONER

## 2018-03-15 DIAGNOSIS — Z12.31 ENCOUNTER FOR SCREENING MAMMOGRAM FOR BREAST CANCER: ICD-10-CM

## 2018-03-15 DIAGNOSIS — N64.4 BREAST PAIN, LEFT: ICD-10-CM

## 2018-03-15 DIAGNOSIS — R92.8 ABNORMAL MAMMOGRAM: Primary | ICD-10-CM

## 2018-03-15 PROCEDURE — 77062 BREAST TOMOSYNTHESIS BI: CPT | Performed by: RADIOLOGY

## 2018-03-15 PROCEDURE — 77066 DX MAMMO INCL CAD BI: CPT

## 2018-03-15 PROCEDURE — 76642 ULTRASOUND BREAST LIMITED: CPT

## 2018-03-15 PROCEDURE — 77066 DX MAMMO INCL CAD BI: CPT | Performed by: RADIOLOGY

## 2018-03-15 PROCEDURE — 76642 ULTRASOUND BREAST LIMITED: CPT | Performed by: RADIOLOGY

## 2018-03-15 PROCEDURE — G0279 TOMOSYNTHESIS, MAMMO: HCPCS

## 2018-03-21 ENCOUNTER — HOSPITAL ENCOUNTER (OUTPATIENT)
Dept: MAMMOGRAPHY | Facility: HOSPITAL | Age: 58
Discharge: HOME | End: 2018-03-21

## 2018-03-21 ENCOUNTER — TRANSCRIBE ORDERS (OUTPATIENT)
Dept: MAMMOGRAPHY | Facility: HOSPITAL | Age: 58
End: 2018-03-21

## 2018-03-21 ENCOUNTER — HOSPITAL ENCOUNTER (OUTPATIENT)
Dept: MAMMOGRAPHY | Facility: HOSPITAL | Age: 58
Discharge: HOME | End: 2018-03-21
Admitting: NURSE PRACTITIONER

## 2018-03-21 DIAGNOSIS — R92.8 ABNORMAL MAMMOGRAM: ICD-10-CM

## 2018-03-21 DIAGNOSIS — R92.8 ABNORMAL MAMMOGRAM: Primary | ICD-10-CM

## 2018-03-21 PROCEDURE — A4648 IMPLANTABLE TISSUE MARKER: HCPCS

## 2018-03-21 PROCEDURE — 77065 DX MAMMO INCL CAD UNI: CPT | Performed by: RADIOLOGY

## 2018-03-21 PROCEDURE — 88305 TISSUE EXAM BY PATHOLOGIST: CPT | Performed by: RADIOLOGY

## 2018-03-21 PROCEDURE — 76098 X-RAY EXAM SURGICAL SPECIMEN: CPT

## 2018-03-21 PROCEDURE — 19081 BX BREAST 1ST LESION STRTCTC: CPT | Performed by: RADIOLOGY

## 2018-03-21 RX ORDER — LIDOCAINE HYDROCHLORIDE 10 MG/ML
5 INJECTION, SOLUTION INFILTRATION; PERINEURAL ONCE
Status: COMPLETED | OUTPATIENT
Start: 2018-03-21 | End: 2018-03-21

## 2018-03-21 RX ORDER — LIDOCAINE HYDROCHLORIDE AND EPINEPHRINE 10; 10 MG/ML; UG/ML
10 INJECTION, SOLUTION INFILTRATION; PERINEURAL ONCE
Status: COMPLETED | OUTPATIENT
Start: 2018-03-21 | End: 2018-03-21

## 2018-03-21 RX ADMIN — LIDOCAINE HYDROCHLORIDE,EPINEPHRINE BITARTRATE 15 ML: 10; .01 INJECTION, SOLUTION INFILTRATION; PERINEURAL at 12:10

## 2018-03-21 RX ADMIN — LIDOCAINE HYDROCHLORIDE 5 ML: 10 INJECTION, SOLUTION INFILTRATION; PERINEURAL at 12:10

## 2018-03-22 LAB
CYTO UR: NORMAL
LAB AP CASE REPORT: NORMAL
LAB AP CLINICAL INFORMATION: NORMAL
LAB AP DIAGNOSIS COMMENT: NORMAL
Lab: NORMAL
PATH REPORT.FINAL DX SPEC: NORMAL
PATH REPORT.GROSS SPEC: NORMAL

## 2018-03-26 ENCOUNTER — TELEPHONE (OUTPATIENT)
Dept: MAMMOGRAPHY | Facility: HOSPITAL | Age: 58
End: 2018-03-26

## 2018-03-26 NOTE — TELEPHONE ENCOUNTER
Pt notified of pathology results and recommendations. Verbalizes understanding. Denies discomfort. Denies any signs and symptoms of infection.

## 2018-04-02 ENCOUNTER — APPOINTMENT (OUTPATIENT)
Dept: MAMMOGRAPHY | Facility: HOSPITAL | Age: 58
End: 2018-04-02

## 2018-04-17 RX ORDER — BUPROPION HYDROCHLORIDE 150 MG/1
150 TABLET, EXTENDED RELEASE ORAL 2 TIMES DAILY
Qty: 60 TABLET | Refills: 5 | Status: SHIPPED | OUTPATIENT
Start: 2018-04-17 | End: 2018-11-07 | Stop reason: SDUPTHER

## 2018-04-25 ENCOUNTER — TELEPHONE (OUTPATIENT)
Dept: FAMILY MEDICINE CLINIC | Facility: CLINIC | Age: 58
End: 2018-04-25

## 2018-04-25 RX ORDER — VALACYCLOVIR HYDROCHLORIDE 1 G/1
TABLET, FILM COATED ORAL
Qty: 4 TABLET | Refills: 5 | Status: SHIPPED | OUTPATIENT
Start: 2018-04-25 | End: 2018-05-07

## 2018-04-25 NOTE — TELEPHONE ENCOUNTER
PATIENT CALLED AND STATES SHE HAS A COLD SORE AND WOULD LIKE A PRESCRIPTION FOR VALTRAX SENT TO [facility].

## 2018-05-07 ENCOUNTER — OFFICE VISIT (OUTPATIENT)
Dept: FAMILY MEDICINE CLINIC | Facility: CLINIC | Age: 58
End: 2018-05-07

## 2018-05-07 ENCOUNTER — HOSPITAL ENCOUNTER (OUTPATIENT)
Dept: GENERAL RADIOLOGY | Facility: HOSPITAL | Age: 58
Discharge: HOME | End: 2018-05-07
Admitting: FAMILY MEDICINE

## 2018-05-07 ENCOUNTER — TELEPHONE (OUTPATIENT)
Dept: FAMILY MEDICINE CLINIC | Facility: CLINIC | Age: 58
End: 2018-05-07

## 2018-05-07 VITALS
WEIGHT: 163.5 LBS | BODY MASS INDEX: 27.91 KG/M2 | HEART RATE: 84 BPM | SYSTOLIC BLOOD PRESSURE: 114 MMHG | HEIGHT: 64 IN | DIASTOLIC BLOOD PRESSURE: 74 MMHG | TEMPERATURE: 98.2 F

## 2018-05-07 DIAGNOSIS — S60.222A CONTUSION OF LEFT HAND, INITIAL ENCOUNTER: ICD-10-CM

## 2018-05-07 DIAGNOSIS — S60.222A CONTUSION OF LEFT HAND, INITIAL ENCOUNTER: Primary | ICD-10-CM

## 2018-05-07 PROCEDURE — 99213 OFFICE O/P EST LOW 20 MIN: CPT | Performed by: FAMILY MEDICINE

## 2018-05-07 PROCEDURE — 73130 X-RAY EXAM OF HAND: CPT | Performed by: RADIOLOGY

## 2018-05-07 PROCEDURE — 73130 X-RAY EXAM OF HAND: CPT

## 2018-05-07 NOTE — PROGRESS NOTES
"Subjective     Chief Complaint   Patient presents with   • Left hand injury       Zuleyma Keating is a 57 y.o. female.     History of Present Illness fell 2 days ago at home.  Injured left hand.  Since then pain swelling no significant bruising.  Has kept ice on it.  Painful extension of fingers flexion-extension of wrist.  Most pain is in dorsum hand.  No other significant problems.    The following portions of the patient's history were reviewed and updated as appropriate: allergies, current medications, past family history, past social history, past surgical history and problem list.    Review of Systems left hand    Objective   Physical Exam   Constitutional: She is oriented to person, place, and time. She appears well-developed and well-nourished.   Musculoskeletal:   Left hand swollen.  Tender along diffusely the metacarpals.  No significant palm are tenderness.  Wrist with painful range of motion.  Vascular intact.   Neurological: She is oriented to person, place, and time.   Psychiatric: She has a normal mood and affect.   Vitals reviewed.    /74 (BP Location: Left arm, Patient Position: Sitting, Cuff Size: Adult)   Pulse 84   Temp 98.2 °F (36.8 °C) (Oral)   Ht 162.6 cm (64.02\")   Wt 74.2 kg (163 lb 8 oz)   LMP  (LMP Unknown)   BMI 28.05 kg/m²   Assessment/Plan   Zuleyma was seen today for left hand injury.    Diagnoses and all orders for this visit:    Contusion of left hand, initial encounter  -     XR Hand 3+ View Left; Future       Will get x-ray.  Anticipate orthopedic referral.  Discussed but you declined walk-in orthopedic referral prior to outpatient x-ray.  Will notify of results.  Continue to ice as possible.  Use OTC analgesia.         "

## 2018-05-07 NOTE — TELEPHONE ENCOUNTER
Okay to come by and see if we have a wrist splint that will help her or consider an Ace wrap from the pharmacy.  If has difficulty or things do not improve over next few days should let us know.

## 2018-05-07 NOTE — TELEPHONE ENCOUNTER
PATIENT CALLED AND WANTS TO KNOW IF THERE IS ANYTHING SHE CAN PUT IN HER HAD OR WHAT SHE CAN DO TO HELP WITH STABILIZING HER HAND?

## 2018-05-24 ENCOUNTER — OFFICE VISIT (OUTPATIENT)
Dept: OBSTETRICS AND GYNECOLOGY | Facility: CLINIC | Age: 58
End: 2018-05-24

## 2018-05-24 VITALS — DIASTOLIC BLOOD PRESSURE: 62 MMHG | BODY MASS INDEX: 27.96 KG/M2 | WEIGHT: 163 LBS | SYSTOLIC BLOOD PRESSURE: 110 MMHG

## 2018-05-24 DIAGNOSIS — N95.1 MENOPAUSAL SYMPTOMS: ICD-10-CM

## 2018-05-24 DIAGNOSIS — N81.2 INCOMPLETE UTEROVAGINAL PROLAPSE: Primary | ICD-10-CM

## 2018-05-24 PROCEDURE — 99214 OFFICE O/P EST MOD 30 MIN: CPT | Performed by: OBSTETRICS & GYNECOLOGY

## 2018-05-24 RX ORDER — ESTROGEN,CON/M-PROGEST ACET 0.625-2.5
1 TABLET ORAL DAILY
Qty: 30 TABLET | Refills: 6 | Status: SHIPPED | OUTPATIENT
Start: 2018-05-24 | End: 2018-06-28

## 2018-05-24 NOTE — PROGRESS NOTES
Subjective   Chief Complaint   Patient presents with   • Gynecologic Exam     c/o's worsening prolapse     Zuleyma Keating is a 57 y.o. year old .  No LMP recorded (lmp unknown). Patient is postmenopausal.  She presents to be seen because of Concerns that the bladder may be falling out more.  She has no significant trouble with urination or incontinence.  She is sexually active without discomfort or pain.  She has no trouble with constipation or stool evacuation.  It is more difficult jumping on the trampoline.  She's been talking to her friends and has concerns that the prolapse may be getting worse.  She has concerns that he may be falling out farther and this may be dangerous.    She is also more arango.  She does have hot flashes and trouble sleeping.    The prolapse is more noticeable and she's been on her feet for prolonged periods of time.  When she gets off her feet feel significantly better.  She can have her see a protruding out of the introitus.  She never has difficulty with rubbing or irritation otherwise.  She has never done kilos exercises and does not know with his mean    The following portions of the patient's history were reviewed and updated as appropriate:current medications and allergies    Smoking status: Never Smoker                                                              Smokeless tobacco: Never Used                             Objective   /62 (Patient Position: Sitting, Cuff Size: Adult)   Wt 73.9 kg (163 lb)   LMP  (LMP Unknown)   BMI 27.96 kg/m²     Lab Review   No data reviewed    Imaging   No data reviewed        Assessment   1. Pelvic organ prolapse - not significantly different from prior exams.  It is minimally symptomatic and not affecting her quality of life.  2. Menopausal female currently not on HRT - with significant symptoms affecting activities of daily living     Plan   1. Spent time talking about options to treat symptomatic prolapse which include  pessary or surgical correction.  Limitations on lifting both short and long-term after surgical correction were reviewed  2. Impact of excess body weight and constipation on worsening of prolapse were reviewed  3. Kegel's were encouraged  4. For now I wouldn't discourage her from proceeding with surgical correction.  Rather I like to see how weight loss, pelvic floor strengthening and hormone replacement does at improving but sounds like minimal symptomatology  5. Data from the women's health initiative study was reviewed.  With Prempro versus placebo, it was explained that there was no significant difference in the rates of stroke, heart attack or breast cancer until greater than 5 years of use.  The magnitude of risk after 5 years of use was approximately 7 additional cases of breast cancer, heart attack and stroke per 10,000 women years of use.  When the data was reanalyzed including only those women initiating HRT within close proximity of the natural menopause, only the rate of stroke persisted.  Furthermore, data is only available for Prempro.  Any extrapolation to other forms of hormone therapy cannot accurately say whether the risks are equal, less for more than with the medications studied.  Ultimately, if she wishes to use hormone replacement therapy, the goal would be to try to reduce it to the lowest possible dose.  Preferably, the goal would be total withdrawal of systemic hormone therapy by 5 years.  There is no good prospective data to quantify the risk for use of HRT for greater than 6 years.  6. The importance of keeping all planned follow-up and taking all medications as prescribed was emphasized.  7. Follow up for annual exam 4 months    New Medications Ordered This Visit   Medications   • PREMPRO 0.625-2.5 MG per tablet     Sig: Take 1 tablet by mouth Daily.     Dispense:  30 tablet     Refill:  6          This note was electronically signed.    Giovanny Estes M.D.  May 24, 2018    Total  time spent today with Zuleyma  was 30 minutes (level 4).  Off this time, 100% was spent face-to-face time coordinating care, answering her questions and counseling regarding pathophysiology of her presenting problem along with plans for any diagnositc work-up and treatment.    Note: Speech recognition transcription software may have been used to create portions of this document.  An attempt at proofreading has been made but errors in transcription could still be present.

## 2018-06-28 ENCOUNTER — OFFICE VISIT (OUTPATIENT)
Dept: FAMILY MEDICINE CLINIC | Facility: CLINIC | Age: 58
End: 2018-06-28

## 2018-06-28 VITALS
DIASTOLIC BLOOD PRESSURE: 71 MMHG | HEART RATE: 88 BPM | SYSTOLIC BLOOD PRESSURE: 112 MMHG | TEMPERATURE: 97.8 F | WEIGHT: 163.7 LBS | BODY MASS INDEX: 27.95 KG/M2 | HEIGHT: 64 IN

## 2018-06-28 DIAGNOSIS — F41.9 ANXIETY: Primary | ICD-10-CM

## 2018-06-28 DIAGNOSIS — R42 DIZZINESS: ICD-10-CM

## 2018-06-28 DIAGNOSIS — R53.83 FATIGUE, UNSPECIFIED TYPE: ICD-10-CM

## 2018-06-28 LAB
25(OH)D3 SERPL-MCNC: 25 NG/ML
ALBUMIN SERPL-MCNC: 4.3 G/DL (ref 3.5–5)
ALBUMIN/GLOB SERPL: 1.6 G/DL (ref 1.5–2.5)
ALP SERPL-CCNC: 112 U/L (ref 35–104)
ALT SERPL W P-5'-P-CCNC: 26 U/L (ref 10–36)
ANION GAP SERPL CALCULATED.3IONS-SCNC: 3.5 MMOL/L (ref 3.6–11.2)
AST SERPL-CCNC: 19 U/L (ref 10–30)
BASOPHILS # BLD AUTO: 0.06 10*3/MM3 (ref 0–0.3)
BASOPHILS NFR BLD AUTO: 1 % (ref 0–2)
BILIRUB SERPL-MCNC: 0.3 MG/DL (ref 0.2–1.8)
BUN BLD-MCNC: 10 MG/DL (ref 7–21)
BUN/CREAT SERPL: 13 (ref 7–25)
CALCIUM SPEC-SCNC: 9 MG/DL (ref 7.7–10)
CHLORIDE SERPL-SCNC: 107 MMOL/L (ref 99–112)
CO2 SERPL-SCNC: 30.5 MMOL/L (ref 24.3–31.9)
CREAT BLD-MCNC: 0.77 MG/DL (ref 0.43–1.29)
DEPRECATED RDW RBC AUTO: 45.2 FL (ref 37–54)
EOSINOPHIL # BLD AUTO: 0.18 10*3/MM3 (ref 0–0.7)
EOSINOPHIL NFR BLD AUTO: 2.9 % (ref 0–5)
ERYTHROCYTE [DISTWIDTH] IN BLOOD BY AUTOMATED COUNT: 13.9 % (ref 11.5–14.5)
GFR SERPL CREATININE-BSD FRML MDRD: 78 ML/MIN/1.73
GLOBULIN UR ELPH-MCNC: 2.7 GM/DL
GLUCOSE BLD-MCNC: 106 MG/DL (ref 70–110)
HCT VFR BLD AUTO: 41.7 % (ref 37–47)
HGB BLD-MCNC: 14.1 G/DL (ref 12–16)
IMM GRANULOCYTES # BLD: 0.02 10*3/MM3 (ref 0–0.03)
IMM GRANULOCYTES NFR BLD: 0.3 % (ref 0–0.5)
LYMPHOCYTES # BLD AUTO: 1.93 10*3/MM3 (ref 1–3)
LYMPHOCYTES NFR BLD AUTO: 31.3 % (ref 21–51)
MCH RBC QN AUTO: 30.5 PG (ref 27–33)
MCHC RBC AUTO-ENTMCNC: 33.8 G/DL (ref 33–37)
MCV RBC AUTO: 90.1 FL (ref 80–94)
MONOCYTES # BLD AUTO: 0.6 10*3/MM3 (ref 0.1–0.9)
MONOCYTES NFR BLD AUTO: 9.7 % (ref 0–10)
NEUTROPHILS # BLD AUTO: 3.37 10*3/MM3 (ref 1.4–6.5)
NEUTROPHILS NFR BLD AUTO: 54.8 % (ref 30–70)
OSMOLALITY SERPL CALC.SUM OF ELEC: 280.7 MOSM/KG (ref 273–305)
PLATELET # BLD AUTO: 309 10*3/MM3 (ref 130–400)
PMV BLD AUTO: 8.6 FL (ref 6–10)
POTASSIUM BLD-SCNC: 4.2 MMOL/L (ref 3.5–5.3)
PROT SERPL-MCNC: 7 G/DL (ref 6–8)
RBC # BLD AUTO: 4.63 10*6/MM3 (ref 4.2–5.4)
SODIUM BLD-SCNC: 141 MMOL/L (ref 135–153)
TSH SERPL DL<=0.05 MIU/L-ACNC: 4.03 MIU/ML (ref 0.55–4.78)
VIT B12 BLD-MCNC: 233 PG/ML (ref 211–911)
WBC NRBC COR # BLD: 6.16 10*3/MM3 (ref 4.5–12.5)

## 2018-06-28 PROCEDURE — 82306 VITAMIN D 25 HYDROXY: CPT | Performed by: FAMILY MEDICINE

## 2018-06-28 PROCEDURE — 84443 ASSAY THYROID STIM HORMONE: CPT | Performed by: FAMILY MEDICINE

## 2018-06-28 PROCEDURE — 85025 COMPLETE CBC W/AUTO DIFF WBC: CPT | Performed by: FAMILY MEDICINE

## 2018-06-28 PROCEDURE — 80053 COMPREHEN METABOLIC PANEL: CPT | Performed by: FAMILY MEDICINE

## 2018-06-28 PROCEDURE — 36415 COLL VENOUS BLD VENIPUNCTURE: CPT | Performed by: FAMILY MEDICINE

## 2018-06-28 PROCEDURE — 82607 VITAMIN B-12: CPT | Performed by: FAMILY MEDICINE

## 2018-06-28 PROCEDURE — 99214 OFFICE O/P EST MOD 30 MIN: CPT | Performed by: FAMILY MEDICINE

## 2018-06-28 NOTE — PROGRESS NOTES
"Subjective     Chief Complaint   Patient presents with   • Dizziness   • Irritable       Zuleyma Keating is a 57 y.o. female.     History of Present Illness has essentially not been feeling well for a while.  More depressed.  More irritable.  More anxious.  No episodes of tearfulness.  Has had some unprovoked dizziness.  Potentially worse since mother  a few months ago.  Sleep patterns are affected.  Energy fair.  Denies recent illness.  Denies fevers chills cough SOB CP palpitations GI .    The following portions of the patient's history were reviewed and updated as appropriate: allergies, past family history, past medical history, past social history, past surgical history and problem list.    Review of Systems see the history of present illness    Objective   Physical Exam   Constitutional: She is oriented to person, place, and time. She appears well-developed and well-nourished.   HENT:   Head: Normocephalic.   Right Ear: External ear normal.   Left Ear: External ear normal.   Mouth/Throat: Oropharynx is clear and moist.   Eyes: Conjunctivae and EOM are normal. Pupils are equal, round, and reactive to light.   Neck: Normal range of motion. Neck supple. No tracheal deviation present. No thyromegaly present.   Cardiovascular: Normal rate, regular rhythm and normal heart sounds.    No murmur heard.  Pulmonary/Chest: Effort normal and breath sounds normal.   Abdominal: Soft. Bowel sounds are normal. There is no tenderness.   Musculoskeletal: She exhibits no edema.   Neurological: She is alert and oriented to person, place, and time.   Skin: Skin is warm and dry.   Psychiatric: She has a normal mood and affect.   Vitals reviewed.    /71 (BP Location: Left arm, Patient Position: Sitting, Cuff Size: Adult)   Pulse 88   Temp 97.8 °F (36.6 °C) (Oral)   Ht 162.6 cm (64.02\")   Wt 74.3 kg (163 lb 11.2 oz)   LMP  (LMP Unknown)   BMI 28.08 kg/m²   Assessment/Plan   Zuleyma was seen today for dizziness " and irritable.    Diagnoses and all orders for this visit:    Anxiety  -     CBC & Differential  -     Comprehensive Metabolic Panel  -     TSH  -     Vitamin D 25 Hydroxy  -     Vitamin B12  -     CBC Auto Differential    Fatigue, unspecified type  -     CBC & Differential  -     Comprehensive Metabolic Panel  -     TSH  -     Vitamin D 25 Hydroxy  -     Vitamin B12  -     CBC Auto Differential    Dizziness  -     CBC & Differential  -     Comprehensive Metabolic Panel  -     TSH  -     Vitamin D 25 Hydroxy  -     Vitamin B12  -     CBC Auto Differential     It is possible you are experiencing either failure of the Wellbutrin or side effect of Wellbutrin.  It is possible you're experiencing something unrelated.  Today will check labs.  I am not enthusiastic about maximizing the Wellbutrin.  In fact I think a change could be beneficial.  Decrease it to once daily.  Will notify of results in a few days.  Discussed the possibility of fisting with behavioral health provider.  You will decide.  Follow-up pending.

## 2018-07-03 DIAGNOSIS — E53.8 LOW SERUM VITAMIN B12: Primary | ICD-10-CM

## 2018-07-19 ENCOUNTER — LAB (OUTPATIENT)
Dept: FAMILY MEDICINE CLINIC | Facility: CLINIC | Age: 58
End: 2018-07-19

## 2018-07-19 DIAGNOSIS — E53.8 LOW SERUM VITAMIN B12: ICD-10-CM

## 2018-07-19 PROCEDURE — 83921 ORGANIC ACID SINGLE QUANT: CPT | Performed by: FAMILY MEDICINE

## 2018-07-19 PROCEDURE — 36415 COLL VENOUS BLD VENIPUNCTURE: CPT | Performed by: FAMILY MEDICINE

## 2018-07-26 ENCOUNTER — TELEPHONE (OUTPATIENT)
Dept: FAMILY MEDICINE CLINIC | Facility: CLINIC | Age: 58
End: 2018-07-26

## 2018-07-27 LAB
Lab: NORMAL
METHYLMALONATE SERPL-SCNC: 146 NMOL/L (ref 0–378)

## 2018-07-30 DIAGNOSIS — E53.8 VITAMIN B12 DEFICIENCY: Primary | ICD-10-CM

## 2018-07-30 RX ORDER — CYANOCOBALAMIN 1000 UG/ML
1000 INJECTION, SOLUTION INTRAMUSCULAR; SUBCUTANEOUS
Status: DISCONTINUED | OUTPATIENT
Start: 2018-07-30 | End: 2018-08-27

## 2018-07-30 RX ORDER — ERGOCALCIFEROL 1.25 MG/1
50000 CAPSULE ORAL WEEKLY
Qty: 4 CAPSULE | Refills: 6 | Status: SHIPPED | OUTPATIENT
Start: 2018-07-30 | End: 2019-01-31 | Stop reason: SDUPTHER

## 2018-07-30 NOTE — PROGRESS NOTES
I want her to take vitamin D50,000 units once weekly new prescription sent in.  I want her to receive B12 injections once monthly order put in. Has she decided regarding visiting behavioral health specialist for further medication adjustments.

## 2018-08-24 ENCOUNTER — TELEPHONE (OUTPATIENT)
Dept: FAMILY MEDICINE CLINIC | Facility: CLINIC | Age: 58
End: 2018-08-24

## 2018-08-24 NOTE — TELEPHONE ENCOUNTER
PATIENT REQUESTING A PRESCRIPTION ON B12. SEND TO [facility]. SHE WOULD RATHER DO THEM AT HOME.

## 2018-09-24 ENCOUNTER — HOSPITAL ENCOUNTER (OUTPATIENT)
Dept: MAMMOGRAPHY | Facility: HOSPITAL | Age: 58
Discharge: HOME | End: 2018-09-24
Admitting: FAMILY MEDICINE

## 2018-09-24 ENCOUNTER — TRANSCRIBE ORDERS (OUTPATIENT)
Dept: MAMMOGRAPHY | Facility: HOSPITAL | Age: 58
End: 2018-09-24

## 2018-09-24 DIAGNOSIS — R92.8 ABNORMAL MAMMOGRAM: ICD-10-CM

## 2018-09-24 DIAGNOSIS — R92.8 ABNORMAL MAMMOGRAM: Primary | ICD-10-CM

## 2018-09-24 PROCEDURE — 77065 DX MAMMO INCL CAD UNI: CPT | Performed by: RADIOLOGY

## 2018-09-24 PROCEDURE — 77065 DX MAMMO INCL CAD UNI: CPT

## 2018-10-16 RX ORDER — VALACYCLOVIR HYDROCHLORIDE 1 G/1
TABLET, FILM COATED ORAL
Qty: 4 TABLET | Refills: 5 | Status: SHIPPED | OUTPATIENT
Start: 2018-10-16 | End: 2019-03-14 | Stop reason: SDUPTHER

## 2018-10-17 ENCOUNTER — OFFICE VISIT (OUTPATIENT)
Dept: OBSTETRICS AND GYNECOLOGY | Facility: CLINIC | Age: 58
End: 2018-10-17

## 2018-10-17 VITALS — BODY MASS INDEX: 27.28 KG/M2 | DIASTOLIC BLOOD PRESSURE: 72 MMHG | SYSTOLIC BLOOD PRESSURE: 126 MMHG | WEIGHT: 159 LBS

## 2018-10-17 DIAGNOSIS — N81.2 CYSTOCELE WITH INCOMPLETE UTEROVAGINAL PROLAPSE: ICD-10-CM

## 2018-10-17 DIAGNOSIS — Z01.419 WELL WOMAN EXAM: Primary | ICD-10-CM

## 2018-10-17 PROCEDURE — 99396 PREV VISIT EST AGE 40-64: CPT | Performed by: OBSTETRICS & GYNECOLOGY

## 2018-10-17 RX ORDER — CYANOCOBALAMIN 1000 UG/ML
INJECTION, SOLUTION INTRAMUSCULAR; SUBCUTANEOUS
COMMUNITY
Start: 2018-10-03 | End: 2018-10-17 | Stop reason: SDUPTHER

## 2018-10-17 NOTE — PROGRESS NOTES
"Subjective   Chief Complaint   Patient presents with   • Gynecologic Exam     GYN f/u for uterine prolapse     Zuleyma Keating is a 57 y.o. year old  menopausal female presenting to be seen for her annual exam.  This past year she has not been on hormone replacement therapy. Age of menopause was around 49-50. There has not been vaginal bleeding in the last 12 months.  Menopausal symptoms are present (hot flashes) but not affecting her quality of life .    SEXUAL Hx:  She is currently sexually active. ,  In the past year there there has been NO new sexual partners.    Condoms are never used.  She would not like to be screened for STD's at today's exam.  [place] is painful: no  HEALTH Hx:  She exercises regularly: yes.  She wears her seat belt: yes.  She has concerns about domestic violence: no.  She has noticed changes in height: no.  OTHER THINGS SHE WANTS TO DISCUSS TODAY:  In regards to her prolapse she reports \"feeling it more\" like with walking more and showering. When sitting at work she doesn't notice. Picking up grandchildren she notices it as well.     The following portions of the patient's history were reviewed and updated as appropriate:problem list, current medications, allergies, past family history, past medical history, past social history and past surgical history.    Smoking status: Never Smoker                                                              Smokeless tobacco: Never Used                          Review of Systems  Constitutional POS: nothing reported    NEG: anorexia or night sweats   Genitourinary POS: both stress and urge incontinence are  present but it IS NOT effecting her ADL's    NEG: dysuria or hematuria      Gastointestinal POS: nothing reported    NEG: bloating, change in bowel habits, melena or reflux symptoms   Integument POS: nothing reported    NEG: moles that are changing in size, shape, color or rashes   Breast POS: nothing reported    NEG: " persistent breast lump, skin dimpling or nipple discharge        Objective   /72 (Patient Position: Sitting)   Wt 72.1 kg (159 lb)   LMP  (LMP Unknown)   BMI 27.28 kg/m²     General:  well developed; well nourished  no acute distress   Skin:  No suspicious lesions seen   Thyroid: normal to inspection and palpation   Breasts:  Examined in supine position  Symmetric without masses or skin dimpling  Nipples normal without inversion, lesions or discharge  There are no palpable axillary nodes   Abdomen: soft, non-tender; no masses  no umbilical or inginual hernias are present  no hepato-splenomegaly   Pelvis: Clinical staff was present for exam  External genitalia:  normal appearance of the external genitalia including Bartholin's and Newman's glands.  :  urethral meatus normal;  Vaginal:  normal pink mucosa without prolapse or lesions.  Cervix:  normal appearance.  Uterus:  normal size, shape and consistency.  Adnexa:  normal bimanual exam of the adnexa.  Rectal:  digital rectal exam not performed; anus visually normal appearing.  Cystocele GRADE 3  Rectocele GRADE 1  Uterine GRADE 2        Assessment   1. Normal GYN exam in menopause  2. She is up to date on all relevant gynecologic and colorectal screenings   3. Pelvic organ prolapse - stable from documented exam in May 2018 with some improvement in uterine prolapse potentially but affecting ADLs  4. Hot flashes, improving      Plan   1. Pap was not done today.  I explained to Zuleyma that the recommendations for Pap smear interval in a low risk patient has lengthened to 3 years time.  I told Zuleyma she still needs to be seen in our office yearly for a full physical including breast and pelvic exam.  2. In regards to pelvic organ prolapse encouraged continued weight loss and kegel exercises. Amb ref for pelvic floor PT placed. Patient desire to move forward with additional management as it is impacting her ADLs more.  3. Data from the women's health  initiative study was reviewed.  With Prempro versus placebo, it was explained that there was no significant difference in the rates of stroke, heart attack or breast cancer until greater than 5 years of use.  The magnitude of risk after 5 years of use was approximately 7 additional cases of breast cancer, heart attack and stroke per 10,000 women years of use.  When the data was reanalyzed including only those women initiating HRT within close proximity of the natural menopause, only the rate of stroke persisted.  Furthermore, data is only available for Prempro.  Any extrapolation to other forms of hormone therapy cannot accurately say whether the risks are equal, less for more than with the medications studied.  Ultimately, if she wishes to use hormone replacement therapy, the goal would be to try to reduce it to the lowest possible dose.  Preferably, the goal would be total withdrawal of systemic hormone therapy by 5 years.  There is no good prospective data to quantify the risk for use of HRT for greater than 6 years. She does not desire to start HRT today.   4. She was encouraged to get yearly mammograms.  She should report any palpable breast lump(s) or skin changes regardless of mammographic findings.  I explained to Zuleyma that notification regarding her mammogram results will come from the center performing the study.  Our office will not be routinely calling with mammogram results.  It is her responsibility to make sure that the results from the mammogram are communicated to her by the breast center.  If she has any questions about the results, she is welcome to call our office anytime.  5. The importance of keeping all planned follow-up and taking all medications as prescribed was emphasized.  6. Follow up for pessary fitting in 4 weeks    No orders of the defined types were placed in this encounter.         This note was electronically signed.    Lucía Walker MD  October 17, 2018    Note: Speech  recognition transcription software may have been used to create portions of this document.  An attempt at proofreading has been made but errors in transcription could still be present.

## 2018-11-08 RX ORDER — BUPROPION HYDROCHLORIDE 150 MG/1
150 TABLET, EXTENDED RELEASE ORAL 2 TIMES DAILY
Qty: 60 TABLET | Refills: 5 | Status: SHIPPED | OUTPATIENT
Start: 2018-11-08 | End: 2019-07-08 | Stop reason: SDUPTHER

## 2018-11-14 ENCOUNTER — OFFICE VISIT (OUTPATIENT)
Dept: OBSTETRICS AND GYNECOLOGY | Facility: CLINIC | Age: 58
End: 2018-11-14

## 2018-11-14 VITALS — DIASTOLIC BLOOD PRESSURE: 60 MMHG | SYSTOLIC BLOOD PRESSURE: 122 MMHG | WEIGHT: 159 LBS | BODY MASS INDEX: 27.28 KG/M2

## 2018-11-14 DIAGNOSIS — N81.4 CYSTOCELE WITH PROLAPSE: Primary | ICD-10-CM

## 2018-11-14 DIAGNOSIS — Z46.89 ENCOUNTER FOR FITTING AND ADJUSTMENT OF PESSARY: ICD-10-CM

## 2018-11-14 PROCEDURE — 57160 INSERT PESSARY/OTHER DEVICE: CPT | Performed by: OBSTETRICS & GYNECOLOGY

## 2018-11-14 PROCEDURE — A4561 PESSARY RUBBER, ANY TYPE: HCPCS | Performed by: OBSTETRICS & GYNECOLOGY

## 2018-11-14 NOTE — PATIENT INSTRUCTIONS
Call if you're unable to pee or have a bowel movement    the estrogen cream and try that twice weekly

## 2018-11-14 NOTE — PROGRESS NOTES
Pessary insertion    Date of procedure:  11/14/2018  CC: Pessary fitting, cystocele   Risks and benefits discussed? yes  All questions answered? yes  Consents given by the patient  Written consent obtained? no    Pessary placed: Foldable ring w/ support - #5 w/o urethral bar       Pessary's attempted without good fit: Foldable ring w/ support - #4 w/o urethral bar     Post procedure instructions: Call ASAP if increasing pain or trouble passing urine or bowels    Follow up for pessary recheck in 2 weeks    This note was electronically signed.    Lucía Walker MD  November 14, 2018

## 2018-11-29 ENCOUNTER — OFFICE VISIT (OUTPATIENT)
Dept: OBSTETRICS AND GYNECOLOGY | Facility: CLINIC | Age: 58
End: 2018-11-29

## 2018-11-29 VITALS — DIASTOLIC BLOOD PRESSURE: 64 MMHG | SYSTOLIC BLOOD PRESSURE: 130 MMHG

## 2018-11-29 DIAGNOSIS — Z46.89 PESSARY MAINTENANCE: ICD-10-CM

## 2018-11-29 DIAGNOSIS — N81.10 PELVIC ORGAN PROLAPSE QUANTIFICATION STAGE 3 CYSTOCELE: Primary | ICD-10-CM

## 2018-11-29 PROCEDURE — 99213 OFFICE O/P EST LOW 20 MIN: CPT | Performed by: OBSTETRICS & GYNECOLOGY

## 2018-11-29 NOTE — PROGRESS NOTES
Subjective   Chief Complaint   Patient presents with   • Gynecologic Exam     F/U on pessary placement     Zuleyma Kaeting is a 57 y.o. year old  who comes for pessary follow up. She had a Foldable ring w/ support - #5 w/o urethral bar pessary placed about 2 weeks. She is happy with the results. It has stayed in placed. Reports sometimes feels like it is pressing on her bladder but denies any dysuria, hematuria. She feels like she is still able to empty her bladder and does not leak urine. No issues with bowel movements. Currently not sexually active by choice.     OTHER THINGS SHE WANTS TO DISCUSS TODAY:  Nothing else       Objective   /64 (Patient Position: Sitting)   LMP  (LMP Unknown)    Pelvic: NEFG, pessary removed easily - washed and replaced easily     Lab Review   No data reviewed    Imaging   No data reviewed       Assessment   1. Pelvic organ prolapse with pessary in place with improvement in symptoms of prolapse     Plan   1. Reviewed importance of letting me know if she is unable to empty bladder or rectum.   2. Continue local estrogen therapy twice weekly   3. The importance of keeping all planned follow-up and taking all medications as prescribed was emphasized.  4. Follow up for pessary recheck and washing in 4 weeks as patient not comfortable doing herself yet       No orders of the defined types were placed in this encounter.         Total time spent today with Zuleyma  was 20 minutes (level 3).  Greater than 50% of the time was spent face-to-face coordinating care, answering her questions and counseling regarding pathophysiology of her presenting problem along with plans for any diagnositc work-up and treatment.    This note was electronically signed.    Lucía Walker MD  2018    Note: Speech recognition transcription software may have been used to create portions of this document.  An attempt at proofreading has been made but errors in transcription could still  be present.

## 2019-01-03 ENCOUNTER — OFFICE VISIT (OUTPATIENT)
Dept: OBSTETRICS AND GYNECOLOGY | Facility: CLINIC | Age: 59
End: 2019-01-03

## 2019-01-03 VITALS — SYSTOLIC BLOOD PRESSURE: 128 MMHG | DIASTOLIC BLOOD PRESSURE: 78 MMHG

## 2019-01-03 DIAGNOSIS — N81.10 BADEN-WALKER GRADE 3 CYSTOCELE: Primary | ICD-10-CM

## 2019-01-03 DIAGNOSIS — Z46.89 PESSARY MAINTENANCE: ICD-10-CM

## 2019-01-03 PROCEDURE — 99213 OFFICE O/P EST LOW 20 MIN: CPT | Performed by: OBSTETRICS & GYNECOLOGY

## 2019-01-03 RX ORDER — CYANOCOBALAMIN 1000 UG/ML
INJECTION, SOLUTION INTRAMUSCULAR; SUBCUTANEOUS
COMMUNITY
Start: 2018-12-22 | End: 2019-01-03 | Stop reason: SDUPTHER

## 2019-01-03 NOTE — PROGRESS NOTES
Subjective   Chief Complaint   Patient presents with   • Gynecologic Exam     f/u on pessary     Zuleyma Keating is a 58 y.o. year old .  No LMP recorded (lmp unknown). Patient is postmenopausal.  She presents to be seen because of need for pessary remove, wash and re-insert. Still having success with the pessary. Reports some intermittent abdominal cramping. Sometimes she thinks it presses on her bladder. Denies any issues with urinating or with bowel movements.     OTHER THINGS SHE WANTS TO DISCUSS TODAY:  Nothing else    The following portions of the patient's history were reviewed and updated as appropriate:current medications and allergies    Social History    Tobacco Use      Smoking status: Never Smoker      Smokeless tobacco: Never Used    Review of Systems  Constitutional POS: nothing reported    NEG: anorexia or night sweats   Genitourinary POS: nothing reported    NEG: dysuria or hematuria   Gastointestinal POS: nothing reported    NEG: bloating, change in bowel habits, melena or reflux symptoms   Integument POS: nothing reported    NEG: moles that are changing in size, shape, color or rashes   Breast POS: nothing reported    NEG: persistent breast lump, skin dimpling or nipple discharge         Objective   /78 (Patient Position: Sitting)   LMP  (LMP Unknown)     General:  well developed; well nourished  no acute distress   Skin:  Not performed.   Thyroid: not examined   Lungs:  breathing is unlabored   Heart:  Not performed.   Breasts:  Not performed.   Abdomen: Not performed.   Pelvis: Pessary removed, washed with soap and replaced easily      Lab Review   No data reviewed    Imaging   No data reviewed        Assessment   Grade 3 cystocele with stable exam and controlled symptoms with a Foldable ring w/ support - #5 w/o urethral bar        Plan     1. Reviewed importance of letting me know if she is unable to empty bladder or rectum.   2. Continue local estrogen therapy twice weekly    3. The importance of keeping all planned follow-up and taking all medications as prescribed was emphasized.  4. Follow up for pessary recheck and washing in 6 weeks as patient not comfortable doing herself yet           No orders of the defined types were placed in this encounter.         This note was electronically signed.    Lucía Walker MD  January 3, 2019    Note: Speech recognition transcription software may have been used to create portions of this document.  An attempt at proofreading has been made but errors in transcription could still be present.

## 2019-01-23 ENCOUNTER — OFFICE VISIT (OUTPATIENT)
Dept: FAMILY MEDICINE CLINIC | Facility: CLINIC | Age: 59
End: 2019-01-23

## 2019-01-23 VITALS
HEART RATE: 77 BPM | SYSTOLIC BLOOD PRESSURE: 120 MMHG | WEIGHT: 173.5 LBS | HEIGHT: 64 IN | BODY MASS INDEX: 29.62 KG/M2 | DIASTOLIC BLOOD PRESSURE: 76 MMHG | TEMPERATURE: 96.1 F

## 2019-01-23 DIAGNOSIS — R53.83 FATIGUE, UNSPECIFIED TYPE: ICD-10-CM

## 2019-01-23 DIAGNOSIS — R51.9 WORSENING HEADACHES: ICD-10-CM

## 2019-01-23 DIAGNOSIS — F41.9 ANXIETY: ICD-10-CM

## 2019-01-23 DIAGNOSIS — M25.522 LEFT ELBOW PAIN: Primary | ICD-10-CM

## 2019-01-23 DIAGNOSIS — E55.9 VITAMIN D DEFICIENCY: ICD-10-CM

## 2019-01-23 PROCEDURE — 99214 OFFICE O/P EST MOD 30 MIN: CPT | Performed by: NURSE PRACTITIONER

## 2019-01-23 RX ORDER — BUSPIRONE HYDROCHLORIDE 5 MG/1
5 TABLET ORAL 3 TIMES DAILY
Qty: 90 TABLET | Refills: 1 | Status: SHIPPED | OUTPATIENT
Start: 2019-01-23 | End: 2020-06-22

## 2019-01-23 NOTE — PROGRESS NOTES
Subjective   Zuleyma Keating is a 58 y.o. female.       Patient is presenting today for new symptoms.  She is reporting left elbow pain with occasional swelling noted.  She reports a fall in [place] flower [place] when she landed on her right elbow.  She does try to allow it to heal with time and is now having some increased tenderness and pain with tight feeling at times.  Pain is more felt when she puts pressure on the elbow to lean on a surface.  Very small area that she can palpate which causes a sharp pain in her elbow.  Pain does not shoot down her arm or hand and she denies any numbness of the arm or hand below the elbow. Fell in august and landed on right elbow. No swelling or redness today but she does report that the elbow will get warm and red at times.  Has full range of motion but feels tight at times when she tries to bend the elbow.  She is also having Additional Issue today.  She is having some increased anxiety..  Patient is reporting that her mother passed away just a few months ago and she is feeling some excess depression and anxiety.  She becomes angry easily.  She is not sleeping well.  She has been taking Wellbutrin 150 twice daily for quite some time but feels it is not helping her symptoms.  She denies any suicidal or homicidal thoughts.  Just feels overwhelmed and that she cannot cope with stress.  Wakes up at night and feels her brain begins to work and think about so many different things that she cannot settle back down to go to sleep.  Becomes easily irritated with family members. Becomes upset with herself because she has these issues.  She is also reporting some frequency of stress type headaches with these episodes.  She has stopped her vitamin D supplement.  Continues with vitamin B12 supplement.  She is also Using Premarin 0.625 mg vaginal cream.  She has no other complaints today.           The following portions of the patient's history were reviewed and updated as  appropriate: allergies, current medications, past family history, past medical history, past social history, past surgical history and problem list.    Review of Systems   Constitutional: Negative.    Eyes: Negative.    Respiratory: Negative.    Cardiovascular: Negative.    Gastrointestinal: Negative.    Endocrine: Negative.    Genitourinary: Negative.    Musculoskeletal: Positive for arthralgias.   Skin: Negative.    Allergic/Immunologic: Negative.    Neurological: Positive for headaches.   Psychiatric/Behavioral: Positive for agitation and sleep disturbance. The patient is nervous/anxious.        Objective   Physical Exam   Constitutional: She appears well-developed. No distress.   HENT:   Head: Normocephalic and atraumatic.   Eyes: Conjunctivae and EOM are normal.   Neck: Neck supple. No JVD present.   Cardiovascular: Normal rate and normal heart sounds.   Pulmonary/Chest: Effort normal and breath sounds normal. No respiratory distress.   Abdominal: Soft. Bowel sounds are normal. She exhibits no distension.   Musculoskeletal: Normal range of motion. She exhibits no edema.        Right elbow: Tenderness found.        Arms:  Neurological: She is alert.   Skin: Skin is warm. No rash noted.   Psychiatric: Her behavior is normal. Her mood appears anxious. Cognition and memory are normal. She exhibits a depressed mood.   Please, but sad.  Patient is has some increased issues since her mother passed away from cancer.  No suicidal or homicidal thoughts.  Increased anxiety.         Assessment/Plan   Zuleyma was seen today for elbow pain.  Will order x-ray of patient's left elbow.  Strict to continue ibuprofen as needed only for pain and inflammation.  We will discuss further treatment pending x-ray result.  Discussed at length with patient her increased anxiety and possible depression due to the loss of her mother recently.  Will try BuSpar 5 mg to be taken at bedtime and up to 3 times daily as needed for anxiety and to  help her with sleep.  She will continue her Wellbutrin for now.  Encouraged her to take the medicine as needed and report any increased symptoms to PCP.  Encouraged her not to feel discouraged about her issues with anxiety and stress and irritability.  She will return for some lab work to further evaluate her symptoms A fatigue and increased anxiety.  He is agreeable and verbalizes understanding of current plan.    Diagnoses and all orders for this visit:    Left elbow pain  -     Cancel: XR Elbow 2 View Left (In Office)  -     Cancel: XR Elbow 2 View Left (In Office); Future  -     XR elbow 3+ vw left; Future    Anxiety    Fatigue, unspecified type    Worsening headaches    Other orders  -     busPIRone (BUSPAR) 5 MG tablet; Take 1 tablet by mouth 3 (Three) Times a Day.

## 2019-01-28 ENCOUNTER — TELEPHONE (OUTPATIENT)
Dept: FAMILY MEDICINE CLINIC | Facility: CLINIC | Age: 59
End: 2019-01-28

## 2019-01-28 NOTE — TELEPHONE ENCOUNTER
PATIENT AWARE OF RESULTS OF XRAY  OF ELBOW.  SEE SCANNED IN RESULT.    PATIENT SCHEDULED FOLLOW UP APPOINTMENT.

## 2019-01-31 ENCOUNTER — OFFICE VISIT (OUTPATIENT)
Dept: FAMILY MEDICINE CLINIC | Facility: CLINIC | Age: 59
End: 2019-01-31

## 2019-01-31 VITALS
TEMPERATURE: 97.8 F | BODY MASS INDEX: 30 KG/M2 | DIASTOLIC BLOOD PRESSURE: 73 MMHG | HEART RATE: 80 BPM | WEIGHT: 175.7 LBS | SYSTOLIC BLOOD PRESSURE: 103 MMHG | HEIGHT: 64 IN

## 2019-01-31 DIAGNOSIS — E55.9 VITAMIN D DEFICIENCY: ICD-10-CM

## 2019-01-31 DIAGNOSIS — M25.522 LEFT ELBOW PAIN: Primary | ICD-10-CM

## 2019-01-31 DIAGNOSIS — F41.9 ANXIETY: ICD-10-CM

## 2019-01-31 DIAGNOSIS — R53.83 FATIGUE, UNSPECIFIED TYPE: ICD-10-CM

## 2019-01-31 LAB
25(OH)D3 SERPL-MCNC: 24 NG/ML
ALBUMIN SERPL-MCNC: 4.5 G/DL (ref 3.5–5)
ALBUMIN/GLOB SERPL: 1.5 G/DL (ref 1.5–2.5)
ALP SERPL-CCNC: 118 U/L (ref 35–104)
ALT SERPL W P-5'-P-CCNC: 30 U/L (ref 10–36)
ANION GAP SERPL CALCULATED.3IONS-SCNC: 5.7 MMOL/L (ref 3.6–11.2)
AST SERPL-CCNC: 20 U/L (ref 10–30)
BASOPHILS # BLD AUTO: 0.04 10*3/MM3 (ref 0–0.3)
BASOPHILS NFR BLD AUTO: 0.6 % (ref 0–2)
BILIRUB SERPL-MCNC: 0.4 MG/DL (ref 0.2–1.8)
BUN BLD-MCNC: 15 MG/DL (ref 7–21)
BUN/CREAT SERPL: 19 (ref 7–25)
CALCIUM SPEC-SCNC: 9.7 MG/DL (ref 7.7–10)
CHLORIDE SERPL-SCNC: 101 MMOL/L (ref 99–112)
CHOLEST SERPL-MCNC: 185 MG/DL (ref 0–200)
CO2 SERPL-SCNC: 29.3 MMOL/L (ref 24.3–31.9)
CREAT BLD-MCNC: 0.79 MG/DL (ref 0.43–1.29)
DEPRECATED RDW RBC AUTO: 45 FL (ref 37–54)
EOSINOPHIL # BLD AUTO: 0.24 10*3/MM3 (ref 0–0.7)
EOSINOPHIL NFR BLD AUTO: 3.4 % (ref 0–5)
ERYTHROCYTE [DISTWIDTH] IN BLOOD BY AUTOMATED COUNT: 13.7 % (ref 11.5–14.5)
GFR SERPL CREATININE-BSD FRML MDRD: 75 ML/MIN/1.73
GLOBULIN UR ELPH-MCNC: 3.1 GM/DL
GLUCOSE BLD-MCNC: 96 MG/DL (ref 70–110)
HCT VFR BLD AUTO: 45 % (ref 37–47)
HDLC SERPL-MCNC: 61 MG/DL (ref 60–100)
HGB BLD-MCNC: 15.2 G/DL (ref 12–16)
IMM GRANULOCYTES # BLD AUTO: 0.02 10*3/MM3 (ref 0–0.03)
IMM GRANULOCYTES NFR BLD AUTO: 0.3 % (ref 0–0.5)
LDLC SERPL CALC-MCNC: 104 MG/DL (ref 0–100)
LDLC/HDLC SERPL: 1.71 {RATIO}
LYMPHOCYTES # BLD AUTO: 2.01 10*3/MM3 (ref 1–3)
LYMPHOCYTES NFR BLD AUTO: 28.2 % (ref 21–51)
MCH RBC QN AUTO: 30.6 PG (ref 27–33)
MCHC RBC AUTO-ENTMCNC: 33.8 G/DL (ref 33–37)
MCV RBC AUTO: 90.5 FL (ref 80–94)
MONOCYTES # BLD AUTO: 0.54 10*3/MM3 (ref 0.1–0.9)
MONOCYTES NFR BLD AUTO: 7.6 % (ref 0–10)
NEUTROPHILS # BLD AUTO: 4.27 10*3/MM3 (ref 1.4–6.5)
NEUTROPHILS NFR BLD AUTO: 59.9 % (ref 30–70)
OSMOLALITY SERPL CALC.SUM OF ELEC: 272.7 MOSM/KG (ref 273–305)
PLATELET # BLD AUTO: 348 10*3/MM3 (ref 130–400)
PMV BLD AUTO: 8.5 FL (ref 6–10)
POTASSIUM BLD-SCNC: 4.3 MMOL/L (ref 3.5–5.3)
PROT SERPL-MCNC: 7.6 G/DL (ref 6–8)
RBC # BLD AUTO: 4.97 10*6/MM3 (ref 4.2–5.4)
SODIUM BLD-SCNC: 136 MMOL/L (ref 135–153)
TRIGL SERPL-MCNC: 98 MG/DL (ref 0–150)
TSH SERPL DL<=0.05 MIU/L-ACNC: 4.22 MIU/ML (ref 0.55–4.78)
VIT B12 BLD-MCNC: 575 PG/ML (ref 211–911)
VLDLC SERPL-MCNC: 19.6 MG/DL
WBC NRBC COR # BLD: 7.12 10*3/MM3 (ref 4.5–12.5)

## 2019-01-31 PROCEDURE — 80053 COMPREHEN METABOLIC PANEL: CPT | Performed by: NURSE PRACTITIONER

## 2019-01-31 PROCEDURE — 80061 LIPID PANEL: CPT | Performed by: NURSE PRACTITIONER

## 2019-01-31 PROCEDURE — 82607 VITAMIN B-12: CPT | Performed by: NURSE PRACTITIONER

## 2019-01-31 PROCEDURE — 82306 VITAMIN D 25 HYDROXY: CPT | Performed by: NURSE PRACTITIONER

## 2019-01-31 PROCEDURE — 84443 ASSAY THYROID STIM HORMONE: CPT | Performed by: NURSE PRACTITIONER

## 2019-01-31 PROCEDURE — 99214 OFFICE O/P EST MOD 30 MIN: CPT | Performed by: NURSE PRACTITIONER

## 2019-01-31 PROCEDURE — 85025 COMPLETE CBC W/AUTO DIFF WBC: CPT | Performed by: NURSE PRACTITIONER

## 2019-01-31 PROCEDURE — 36415 COLL VENOUS BLD VENIPUNCTURE: CPT | Performed by: NURSE PRACTITIONER

## 2019-01-31 RX ORDER — ERGOCALCIFEROL 1.25 MG/1
50000 CAPSULE ORAL WEEKLY
Qty: 4 CAPSULE | Refills: 6 | Status: SHIPPED | OUTPATIENT
Start: 2019-01-31 | End: 2020-06-22

## 2019-02-13 ENCOUNTER — OFFICE VISIT (OUTPATIENT)
Dept: OBSTETRICS AND GYNECOLOGY | Facility: CLINIC | Age: 59
End: 2019-02-13

## 2019-02-13 VITALS
WEIGHT: 174.2 LBS | RESPIRATION RATE: 14 BRPM | HEIGHT: 64 IN | DIASTOLIC BLOOD PRESSURE: 80 MMHG | SYSTOLIC BLOOD PRESSURE: 120 MMHG | BODY MASS INDEX: 29.74 KG/M2

## 2019-02-13 DIAGNOSIS — N81.10 PELVIC ORGAN PROLAPSE QUANTIFICATION STAGE 3 CYSTOCELE: Primary | ICD-10-CM

## 2019-02-13 PROCEDURE — 99213 OFFICE O/P EST LOW 20 MIN: CPT | Performed by: OBSTETRICS & GYNECOLOGY

## 2019-03-04 ENCOUNTER — OFFICE VISIT (OUTPATIENT)
Dept: FAMILY MEDICINE CLINIC | Facility: CLINIC | Age: 59
End: 2019-03-04

## 2019-03-04 VITALS
BODY MASS INDEX: 29.37 KG/M2 | DIASTOLIC BLOOD PRESSURE: 78 MMHG | HEIGHT: 64 IN | SYSTOLIC BLOOD PRESSURE: 116 MMHG | WEIGHT: 172 LBS | HEART RATE: 98 BPM | TEMPERATURE: 100.5 F

## 2019-03-04 DIAGNOSIS — R50.9 FEVER, UNSPECIFIED FEVER CAUSE: ICD-10-CM

## 2019-03-04 DIAGNOSIS — J02.9 SORE THROAT: Primary | ICD-10-CM

## 2019-03-04 DIAGNOSIS — Z20.828 EXPOSURE TO THE FLU: ICD-10-CM

## 2019-03-04 LAB
EXPIRATION DATE: NORMAL
EXPIRATION DATE: NORMAL
FLUAV AG NPH QL: NEGATIVE
FLUBV AG NPH QL: NEGATIVE
INTERNAL CONTROL: NORMAL
INTERNAL CONTROL: NORMAL
Lab: NORMAL
Lab: NORMAL
S PYO AG THROAT QL: NEGATIVE

## 2019-03-04 PROCEDURE — 87804 INFLUENZA ASSAY W/OPTIC: CPT | Performed by: NURSE PRACTITIONER

## 2019-03-04 PROCEDURE — 99213 OFFICE O/P EST LOW 20 MIN: CPT | Performed by: NURSE PRACTITIONER

## 2019-03-04 PROCEDURE — 87880 STREP A ASSAY W/OPTIC: CPT | Performed by: NURSE PRACTITIONER

## 2019-03-04 RX ORDER — ONDANSETRON 4 MG/1
4 TABLET, FILM COATED ORAL EVERY 8 HOURS PRN
Qty: 20 TABLET | Refills: 0 | Status: SHIPPED | OUTPATIENT
Start: 2019-03-04 | End: 2020-06-22

## 2019-03-04 RX ORDER — OSELTAMIVIR PHOSPHATE 75 MG/1
75 CAPSULE ORAL 2 TIMES DAILY
Qty: 10 CAPSULE | Refills: 0 | Status: SHIPPED | OUTPATIENT
Start: 2019-03-04 | End: 2019-08-05

## 2019-03-04 NOTE — PROGRESS NOTES
Subjective   Zuleyma Keating is a 58 y.o. female.     Patient is presenting for new onset of symptoms today.  She is concerned for flu symptoms.  She was exposed to flu virus from her grandaoughter, who has tested positive for the flu. She started with symptoms of generalized body aches, fever, chills, cough, sore throat and nausea 2 days ago.  She has had the flu before and feels like she did at that time.  She is febrile today at 100.5.  She tried to work today, but had to leave early.  She has not had a flu vaccine.  She has non-productive cough.  Denies any chest pain or shortness of breath.           The following portions of the patient's history were reviewed and updated as appropriate: past family history, past surgical history and problem list.    Review of Systems   Constitutional: Positive for fever.   HENT: Positive for congestion and sore throat.    Respiratory: Positive for cough.    Gastrointestinal: Positive for nausea.   Musculoskeletal: Positive for myalgias.   Allergic/Immunologic: Positive for environmental allergies.       Objective   Physical Exam   Constitutional: She appears well-developed. She appears ill. No distress.   HENT:   Head: Normocephalic and atraumatic.   Nose: Mucosal edema present.   Eyes: Conjunctivae and EOM are normal.   Neck: Trachea normal. Neck supple. No JVD present.   Cardiovascular: Normal rate and normal heart sounds.   Pulmonary/Chest: Effort normal and breath sounds normal. No respiratory distress.   Abdominal: Soft. Bowel sounds are normal. She exhibits no distension. There is no hepatosplenomegaly. There is no tenderness.   Musculoskeletal: Normal range of motion. She exhibits no edema.   Lymphadenopathy:     She has cervical adenopathy.        Right cervical: Superficial cervical adenopathy present.        Left cervical: Superficial cervical adenopathy present.   Neurological: She is alert.   Skin: Skin is warm. No rash noted.   Psychiatric: She has a normal  mood and affect.   Vitals reviewed.      Assessment/Plan   Zuleyma was seen today for cough, influenza, sore throat and fever.  Flu swab negative today, however will treat with Tamiflu based on her symptoms and exposure from her grandchild. Her Strep swab was negative today.  Instructed to increase fluids and rest.  OTC ibuprofen alternated with tylenol every 3-4 hours for fever and myalgia's.  Off work until fever resolves.  Instructed to return to clinic if symptoms persist, or worsen, with increased fever, productive cough, shortness of breath or chest pain.  She verbalizes understanding.    Diagnoses and all orders for this visit:    Sore throat  -     POC Rapid Strep A  -     POC Influenza A / B    Fever, unspecified fever cause  -     POC Rapid Strep A  -     POC Influenza A / B    Exposure to the flu    Other orders  -     oseltamivir (TAMIFLU) 75 MG capsule; Take 1 capsule by mouth 2 (Two) Times a Day.  -     ondansetron (ZOFRAN) 4 MG tablet; Take 1 tablet by mouth Every 8 (Eight) Hours As Needed for Nausea or Vomiting.

## 2019-03-12 ENCOUNTER — OFFICE VISIT (OUTPATIENT)
Dept: FAMILY MEDICINE CLINIC | Facility: CLINIC | Age: 59
End: 2019-03-12

## 2019-03-12 VITALS
HEIGHT: 64 IN | DIASTOLIC BLOOD PRESSURE: 73 MMHG | WEIGHT: 172 LBS | BODY MASS INDEX: 29.37 KG/M2 | TEMPERATURE: 98.5 F | HEART RATE: 90 BPM | SYSTOLIC BLOOD PRESSURE: 108 MMHG

## 2019-03-12 DIAGNOSIS — J32.0 MAXILLARY SINUSITIS, UNSPECIFIED CHRONICITY: ICD-10-CM

## 2019-03-12 DIAGNOSIS — J06.9 UPPER RESPIRATORY TRACT INFECTION, UNSPECIFIED TYPE: Primary | ICD-10-CM

## 2019-03-12 PROCEDURE — 99214 OFFICE O/P EST MOD 30 MIN: CPT | Performed by: NURSE PRACTITIONER

## 2019-03-12 PROCEDURE — 96372 THER/PROPH/DIAG INJ SC/IM: CPT | Performed by: NURSE PRACTITIONER

## 2019-03-12 RX ORDER — FLUTICASONE PROPIONATE 50 MCG
2 SPRAY, SUSPENSION (ML) NASAL DAILY
Qty: 1 BOTTLE | Refills: 0 | Status: SHIPPED | OUTPATIENT
Start: 2019-03-12 | End: 2020-06-22

## 2019-03-12 RX ORDER — BENZONATATE 100 MG/1
100 CAPSULE ORAL 3 TIMES DAILY PRN
Qty: 20 CAPSULE | Refills: 0 | Status: SHIPPED | OUTPATIENT
Start: 2019-03-12 | End: 2019-08-05

## 2019-03-12 RX ORDER — AMOXICILLIN AND CLAVULANATE POTASSIUM 875; 125 MG/1; MG/1
1 TABLET, FILM COATED ORAL 2 TIMES DAILY
Qty: 10 TABLET | Refills: 0 | Status: SHIPPED | OUTPATIENT
Start: 2019-03-12 | End: 2019-03-17

## 2019-03-12 RX ORDER — CEFTRIAXONE 1 G/1
1 INJECTION, POWDER, FOR SOLUTION INTRAMUSCULAR; INTRAVENOUS ONCE
Status: COMPLETED | OUTPATIENT
Start: 2019-03-12 | End: 2019-03-12

## 2019-03-12 RX ADMIN — CEFTRIAXONE 1 G: 1 INJECTION, POWDER, FOR SOLUTION INTRAMUSCULAR; INTRAVENOUS at 09:53

## 2019-03-12 NOTE — PROGRESS NOTES
Subjective   Zuleyma Keating is a 58 y.o. female.     Patient is presenting today for new onset of symptoms.    She was treated last week for flu exposure and flu symptoms completed her Tamiflu.    She has now started with upper respiratory infection symptoms.    She still having some chills and muscle aches.    She is now having some URI productive cough and sinuses with green/yellow sputum.  She has some chest tightness when coughing.  Has been fatigued and unable to go to work.  She has some occasional nausea but no vomiting or diarrhea.  She has no other complaints today.         The following portions of the patient's history were reviewed and updated as appropriate: past surgical history.    Review of Systems   Constitutional: Positive for chills, fatigue and fever. Negative for appetite change.   HENT: Positive for congestion, sinus pressure and sinus pain. Negative for sore throat, trouble swallowing and voice change.    Eyes: Negative for discharge, itching and visual disturbance.   Respiratory: Positive for cough and chest tightness. Negative for choking and shortness of breath.    Cardiovascular: Negative.  Negative for chest pain, palpitations and leg swelling.   Gastrointestinal: Positive for nausea. Negative for abdominal pain, blood in stool, constipation and diarrhea.   Genitourinary: Negative.  Negative for dysuria, frequency and hematuria.   Musculoskeletal: Negative for arthralgias, back pain and myalgias.   Skin: Negative.  Negative for rash and wound.   Neurological: Positive for headaches. Negative for dizziness and weakness.   Hematological: Does not bruise/bleed easily.   Psychiatric/Behavioral: Negative for sleep disturbance. The patient is not nervous/anxious.        Objective   Physical Exam   Constitutional: She appears well-developed. No distress.   HENT:   Head: Normocephalic and atraumatic.   Nose: Mucosal edema and sinus tenderness present. Right sinus exhibits maxillary sinus  tenderness. Left sinus exhibits maxillary sinus tenderness.   Eyes: Conjunctivae and EOM are normal.   Neck: Neck supple. No JVD present.   Cardiovascular: Normal rate and normal heart sounds.   Pulmonary/Chest: Effort normal and breath sounds normal. No respiratory distress. She has no decreased breath sounds. She has no wheezes.   Abdominal: Soft. Bowel sounds are normal. She exhibits no distension.   Musculoskeletal: Normal range of motion. She exhibits no edema.   Neurological: She is alert.   Skin: Skin is warm. No rash noted.   Psychiatric: She has a normal mood and affect.   Vitals reviewed.      Assessment/Plan   Zuleyma was seen today for uri, fatigue, fever and cough.  Will prescribe Augmentin 875-125 mg to be taken p.o. twice daily times 5 days after administration of Rocephin 1 g IM today.  Prescribe Flonase 50 mg to be taken 2 sprays each nostril daily for the next 2-4 weeks.  Will prescribe Tessalon Perles 100 mg capsules to be taken up to 3 times a day as needed for cough.  Instructed to rest and increase fluids.  Please entire round of antibiotics even if symptoms resolve.  She is instructed to report any increased fever, shortness of breath, chest pain or other cardiopulmonary symptoms ASAP.   If after clinic hours report to emergency room.  Avoid extreme prolonged exposure to cold temperatures.  Medications reconciled as per list.  No other changes today.  To return to clinic for her previously scheduled follow-up or as needed for worsening symptoms.  She verbalizes understanding.    Diagnoses and all orders for this visit:    Upper respiratory tract infection, unspecified type  -     cefTRIAXone (ROCEPHIN) injection 1 g; Inject 1 g into the appropriate muscle as directed by prescriber 1 (One) Time.    Maxillary sinusitis, unspecified chronicity    Other orders  -     amoxicillin-clavulanate (AUGMENTIN) 875-125 MG per tablet; Take 1 tablet by mouth 2 (Two) Times a Day for 5 days.  -     benzonatate  (NAIF CHAVARRIA) 100 MG capsule; Take 1 capsule by mouth 3 (Three) Times a Day As Needed for Cough.  -     fluticasone (FLONASE) 50 MCG/ACT nasal spray; 2 sprays into the nostril(s) as directed by provider Daily.

## 2019-03-14 RX ORDER — VALACYCLOVIR HYDROCHLORIDE 1 G/1
TABLET, FILM COATED ORAL
Qty: 4 TABLET | Refills: 5 | Status: SHIPPED | OUTPATIENT
Start: 2019-03-14

## 2019-03-14 NOTE — TELEPHONE ENCOUNTER
LAST REFILL  [facility]  10-16-18 #4 REFILL X 5    LAST APPOINTMENT:  3-12-19  NEXT APPOINTMENT: NONE

## 2019-03-26 ENCOUNTER — HOSPITAL ENCOUNTER (OUTPATIENT)
Dept: MAMMOGRAPHY | Facility: HOSPITAL | Age: 59
Discharge: HOME | End: 2019-03-26
Admitting: RADIOLOGY

## 2019-03-26 ENCOUNTER — OFFICE VISIT (OUTPATIENT)
Dept: OBSTETRICS AND GYNECOLOGY | Facility: CLINIC | Age: 59
End: 2019-03-26

## 2019-03-26 VITALS
DIASTOLIC BLOOD PRESSURE: 76 MMHG | WEIGHT: 173 LBS | SYSTOLIC BLOOD PRESSURE: 112 MMHG | HEIGHT: 64 IN | BODY MASS INDEX: 29.53 KG/M2

## 2019-03-26 DIAGNOSIS — N39.46 MIXED STRESS AND URGE URINARY INCONTINENCE: Primary | ICD-10-CM

## 2019-03-26 DIAGNOSIS — R92.8 ABNORMAL MAMMOGRAM: ICD-10-CM

## 2019-03-26 DIAGNOSIS — N81.10 PELVIC ORGAN PROLAPSE QUANTIFICATION STAGE 3 CYSTOCELE: ICD-10-CM

## 2019-03-26 PROCEDURE — 77066 DX MAMMO INCL CAD BI: CPT | Performed by: RADIOLOGY

## 2019-03-26 PROCEDURE — 77062 BREAST TOMOSYNTHESIS BI: CPT | Performed by: RADIOLOGY

## 2019-03-26 PROCEDURE — 99213 OFFICE O/P EST LOW 20 MIN: CPT | Performed by: OBSTETRICS & GYNECOLOGY

## 2019-03-26 PROCEDURE — G0279 TOMOSYNTHESIS, MAMMO: HCPCS

## 2019-03-26 PROCEDURE — 77066 DX MAMMO INCL CAD BI: CPT

## 2019-03-26 NOTE — PROGRESS NOTES
"Subjective   Chief Complaint   Patient presents with   • Follow-up     pessary check. pt states no c/o     Zuleyma Keating is a 58 y.o. year old who presents to be seen for follow-up of her pessary.    Overall she is satisfied with how this pessary is doing.  She is aware of it being present.  · She is not removing the pessary herself.  · She is able to empty were bladder without difficulty.  · There is significant urinary incontinence with coughing (she had the flu and bronchitis).  She does have trouble with urinary urgency or frequency.  · There is significant vaginal discharge present.  She is using estrogen cream  2 times per week to help decrease her vaginal discharge.  · She is not having difficulty with bowel function.  · She overall unsure of when and how much she is leaking      OTHER THINGS SHE WANTS TO DISCUSS TODAY:  Nothing else    The following portions of the patient's history were reviewed and updated as appropriate:current medications and allergies    Review of Systems  Constitutional POS: nothing reported    NEG: anorexia or night sweats   Genitourinary POS: nothing reported       NEG: dysuria or hematuria   Gastointestinal POS: nothing reported    NEG: bloating, change in bowel habits, melena or reflux symptoms   Integument POS: nothing reported    NEG: moles that are changing in size, shape, color or rashes        Objective   /76   Ht 162.6 cm (64\")   Wt 78.5 kg (173 lb)   LMP  (LMP Unknown)   Breastfeeding? No   BMI 29.70 kg/m²     General:  well developed; well nourished  no acute distress   Pelvis: Remove, cleaned and replaced pessary      Lab Review   No data reviewed    Imaging   No data reviewed        Assessment   1. Symptomatic prolapse - well controlled with current pessary.  2. Mixed urinary incontinence      Plan   1. Her pessary was removed, cleaned and replaced.  2. The importance of keeping all planned follow-up and taking all medications as prescribed was " emphasized.  3. Follow up for recheck of pessary  in 6 weeks   4. Amb referral to urology placed to assess incontinence as patient is potentially interested in surgery in the future.     No orders of the defined types were placed in this encounter.         This note was electronically signed.    Lucía Walker MD  March 26, 2019    Note: Speech recognition transcription software may have been used to create portions of this document.  An attempt at proofreading has been made but errors in transcription could still be present.

## 2019-05-08 ENCOUNTER — OFFICE VISIT (OUTPATIENT)
Dept: OBSTETRICS AND GYNECOLOGY | Facility: CLINIC | Age: 59
End: 2019-05-08

## 2019-05-08 VITALS
HEIGHT: 64 IN | SYSTOLIC BLOOD PRESSURE: 116 MMHG | BODY MASS INDEX: 29.88 KG/M2 | WEIGHT: 175 LBS | DIASTOLIC BLOOD PRESSURE: 76 MMHG

## 2019-05-08 DIAGNOSIS — Z46.89 PESSARY MAINTENANCE: Primary | ICD-10-CM

## 2019-05-08 PROCEDURE — 99213 OFFICE O/P EST LOW 20 MIN: CPT | Performed by: OBSTETRICS & GYNECOLOGY

## 2019-05-08 RX ORDER — MINOCYCLINE HYDROCHLORIDE 100 MG/1
CAPSULE ORAL
COMMUNITY
Start: 2019-04-29 | End: 2020-06-22

## 2019-05-08 RX ORDER — CLINDAMYCIN AND BENZOYL PEROXIDE 10; 50 MG/G; MG/G
GEL TOPICAL
COMMUNITY
Start: 2019-04-29 | End: 2020-06-22

## 2019-05-08 NOTE — PROGRESS NOTES
"Subjective   Chief Complaint   Patient presents with   • Follow-up     pt here for pessary check.     Zuleyma Keating is a 58 y.o. year old who presents to be seen for follow-up of her pessary.    Overall she is satisfied with how this pessary is doing.  She is aware of it being present.  · She is not removing the pessary herself.  · She is able to empty were bladder without difficulty.  · There is not having significant urinary incontinence - reports it has improved since last visit 6 weeks ago.  She does not have trouble with urinary urgency or frequency.  · There is not significant vaginal discharge present.  She is using estrogen cream  1 times per week to help decrease her vaginal discharge.  · She is not having difficulty with bowel function.     OTHER THINGS SHE WANTS TO DISCUSS TODAY:  Nothing else    The following portions of the patient's history were reviewed and updated as appropriate:current medications and allergies    Review of Systems  Constitutional POS: nothing reported    NEG: anorexia or night sweats   Genitourinary POS: nothing reported       NEG: dysuria or hematuria   Gastointestinal POS: nothing reported    NEG: bloating, change in bowel habits, melena or reflux symptoms   Integument POS: nothing reported    NEG: moles that are changing in size, shape, color or rashes        Objective   /76   Ht 162.6 cm (64\")   Wt 79.4 kg (175 lb)   LMP  (LMP Unknown)   Breastfeeding? No   BMI 30.04 kg/m²     General:  well developed; well nourished  no acute distress   Pelvis: External genitalia:  normal appearance of the external genitalia including Bartholin's and [place]'s glands.  :  urethral meatus normal;  Vaginal: normal pink mucosa without prolapse or lesions; adequate support with pessary in place; no area of erosion or excess granulation tissue seen after pessary removed;     Lab Review   No data reviewed    Imaging   No data reviewed        Assessment   1. Symptomatic prolapse - " well controlled with current pessary (Foldable ring w/ support - #5 w/o urethral bar)     Plan   1. Her pessary was removed, cleaned and replaced.  2. The importance of keeping all planned follow-up and taking all medications as prescribed was emphasized.  3. Follow up for recheck of pessary  in 6 weeks and annual exam in one year     No orders of the defined types were placed in this encounter.         This note was electronically signed.    Lucía Walker MD  May 8, 2019    Note: Speech recognition transcription software may have been used to create portions of this document.  An attempt at proofreading has been made but errors in transcription could still be present.

## 2019-06-20 ENCOUNTER — OFFICE VISIT (OUTPATIENT)
Dept: OBSTETRICS AND GYNECOLOGY | Facility: CLINIC | Age: 59
End: 2019-06-20

## 2019-06-20 VITALS
BODY MASS INDEX: 30.05 KG/M2 | SYSTOLIC BLOOD PRESSURE: 102 MMHG | HEIGHT: 64 IN | WEIGHT: 176 LBS | DIASTOLIC BLOOD PRESSURE: 64 MMHG

## 2019-06-20 DIAGNOSIS — Z46.89 ENCOUNTER FOR PESSARY MAINTENANCE: Primary | ICD-10-CM

## 2019-06-20 PROCEDURE — 99213 OFFICE O/P EST LOW 20 MIN: CPT | Performed by: OBSTETRICS & GYNECOLOGY

## 2019-06-20 NOTE — PROGRESS NOTES
"Subjective   Chief Complaint   Patient presents with   • Follow-up     pt here for pessary maintenance. no c/o     Zuleyma Keating is a 58 y.o. year old who presents to be seen for follow-up of her pessary.    Overall she is satisfied with how this pessary is doing.  She is not aware of it being present.  · She is not removing the pessary herself.  · She is able to empty were bladder without difficulty.  · There is not significant urinary incontinence.  She does not have trouble with urinary urgency or frequency.  · There is not significant vaginal discharge present.  She is using estrogen cream  2 times per week to help decrease her vaginal discharge.  · She is not having difficulty with bowel function.     OTHER THINGS SHE WANTS TO DISCUSS TODAY:  Nothing else    The following portions of the patient's history were reviewed and updated as appropriate:current medications and allergies    Review of Systems  Constitutional POS: nothing reported    NEG: anorexia or night sweats   Genitourinary POS: nothing reported       NEG: dysuria or hematuria   Gastointestinal POS: nothing reported    NEG: bloating, change in bowel habits, melena or reflux symptoms   Integument POS: nothing reported    NEG: moles that are changing in size, shape, color or rashes        Objective   /64   Ht 162.6 cm (64\")   Wt 79.8 kg (176 lb)   LMP  (LMP Unknown)   Breastfeeding? No   BMI 30.21 kg/m²     General:  well developed; well nourished  no acute distress   Pelvis: Clinical staff was present for exam  External genitalia:  normal appearance of the external genitalia including Bartholin's and [place]'s glands.  :  urethral meatus normal;  Vaginal: normal pink mucosa without prolapse or lesions; adequate support with pessary in place; no area of erosion or excess granulation tissue seen after pessary removed;     Lab Review   No data reviewed    Imaging   No data reviewed        Assessment   1. Symptomatic prolapse - well " controlled with current pessary (Foldable ring w/ support - #5 w/o urethral bar)       Plan   1. Her pessary was removed, cleaned and replaced.  2. The importance of keeping all planned follow-up and taking all medications as prescribed was emphasized.  3. Follow up for pessary recheck in 6-8 weeks. Will do speculum exam at next visit.     No orders of the defined types were placed in this encounter.         This note was electronically signed.    Lucía Walker MD  June 20, 2019    Note: Speech recognition transcription software may have been used to create portions of this document.  An attempt at proofreading has been made but errors in transcription could still be present.

## 2019-07-05 NOTE — PROGRESS NOTES
"Subjective   Chief Complaint   Patient presents with   • Follow-up     Pessary maintenance, leaking urine mainly at night     Zuleyma Keating is a 58 y.o. year old .  No LMP recorded (lmp unknown). Patient is postmenopausal.  She presents to be seen because of pessary exchange. She is doing well with the pessary and still using the vaginal estrogen cream but has noticed some leakage of urine at night to where she wakes up and realizes she has wet herself. She does not leak during the day. She does have some urgency that has always been present. Denies any vaginal discharge, dysuria or hematuria     OTHER THINGS SHE WANTS TO DISCUSS TODAY:  Nothing else    The following portions of the patient's history were reviewed and updated as appropriate:current medications and allergies    Social History    Tobacco Use      Smoking status: Never Smoker      Smokeless tobacco: Never Used    Review of Systems  Constitutional POS: nothing reported    NEG: anorexia or night sweats   Genitourinary POS: nothing reported    NEG: incontinence at night   Gastointestinal POS: nothing reported    NEG: bloating, change in bowel habits, melena or reflux symptoms   Integument POS: nothing reported    NEG: moles that are changing in size, shape, color or rashes   Breast POS: nothing reported    NEG: persistent breast lump, skin dimpling or nipple discharge         Objective   /80   Resp 14   Ht 162.6 cm (64.02\")   Wt 79 kg (174 lb 3.2 oz)   LMP  (LMP Unknown)   Breastfeeding? No   BMI 29.88 kg/m²     General:  well developed; well nourished  no acute distress   Skin:  Not performed.   Thyroid: not examined   Lungs:  breathing is unlabored   Heart:  Not performed.   Breasts:  Not performed.   Abdomen: Not performed.   Pelvis: External genitalia:  normal appearance of the external genitalia including Bartholin's and [place]'s glands.  :  urethral meatus normal;  Cystocele GRADE 3  Rectocele GRADE 1  Uterine GRADE 1-2 " [facility]  [address]  [place] [address]  Phone:  [phone]  Fax:  [phone]                                    Je Carranza   MRN: [id number]    Department:  [facility]   Date of Visit:  7/5/2019           After Visit Summary Signature Page    I have received my discharge instructions, and my questions have been answered. I have discussed any challenges I see with this plan with the nurse or doctor.    ..........................................................................................................................................  Patient/Patient Representative Signature      ..........................................................................................................................................  Patient Representative Print Name and Relationship to Patient    ..................................................               ................................................  Date                                   Time    ..........................................................................................................................................  Reviewed by Signature/Title    ...................................................              ..............................................  Date                                               Time          22EPIC Rev 08/18          AV ~6-8 week size uterus  No adnexa palpated      Lab Review   No data reviewed    Imaging   No data reviewed        Assessment   1. Pelvic organ prolapse - symptoms well controlled with current pessary except for some new incontinence at night      Plan   1. Pessary was removed, washed, and re inserted without difficulty today   2. Will try to see in 6 weeks as opposed to 8 weeks to see if this helps with incontinence in case pessary had moved down some in those 2 weeks  3. Reviewed precautions to call regarding urinary symptoms   4. We reviewed if she is not happy with pessary our next step would be TVH/BSO and anterior repair with possible posterior repair conducted with Dr. Estes given the anterior repair aspect. She voiced understanding.   5. The importance of keeping all planned follow-up and taking all medications as prescribed was emphasized.  6. Follow up for pessary recheck in 6 weeks    No orders of the defined types were placed in this encounter.         This note was electronically signed.    Lucía Walker MD  February 13, 2019    Note: Speech recognition transcription software may have been used to create portions of this document.  An attempt at proofreading has been made but errors in transcription could still be present.

## 2019-07-08 RX ORDER — BUPROPION HYDROCHLORIDE 150 MG/1
150 TABLET, EXTENDED RELEASE ORAL 2 TIMES DAILY
Qty: 60 TABLET | Refills: 2 | Status: SHIPPED | OUTPATIENT
Start: 2019-07-08 | End: 2020-03-07

## 2019-07-08 NOTE — TELEPHONE ENCOUNTER
LAST FILLED WELLBUTRIN  11-8-18 # 60 REFILL X 5    LAST APPOINTMENT  3-12-19  NEXT APPOINTMENT NONE

## 2019-08-05 ENCOUNTER — OFFICE VISIT (OUTPATIENT)
Dept: FAMILY MEDICINE CLINIC | Facility: CLINIC | Age: 59
End: 2019-08-05

## 2019-08-05 ENCOUNTER — HOSPITAL ENCOUNTER (OUTPATIENT)
Dept: GENERAL RADIOLOGY | Facility: HOSPITAL | Age: 59
Discharge: HOME | End: 2019-08-05
Admitting: NURSE PRACTITIONER

## 2019-08-05 VITALS
SYSTOLIC BLOOD PRESSURE: 122 MMHG | OXYGEN SATURATION: 96 % | WEIGHT: 176.5 LBS | DIASTOLIC BLOOD PRESSURE: 68 MMHG | HEIGHT: 64 IN | BODY MASS INDEX: 30.13 KG/M2 | HEART RATE: 105 BPM | TEMPERATURE: 97.8 F

## 2019-08-05 DIAGNOSIS — J20.9 ACUTE BRONCHITIS, UNSPECIFIED ORGANISM: Primary | ICD-10-CM

## 2019-08-05 DIAGNOSIS — J20.9 ACUTE BRONCHITIS, UNSPECIFIED ORGANISM: ICD-10-CM

## 2019-08-05 PROCEDURE — 71046 X-RAY EXAM CHEST 2 VIEWS: CPT | Performed by: RADIOLOGY

## 2019-08-05 PROCEDURE — 71046 X-RAY EXAM CHEST 2 VIEWS: CPT

## 2019-08-05 PROCEDURE — 99214 OFFICE O/P EST MOD 30 MIN: CPT | Performed by: NURSE PRACTITIONER

## 2019-08-05 RX ORDER — ALBUTEROL SULFATE 90 UG/1
2 AEROSOL, METERED RESPIRATORY (INHALATION) EVERY 4 HOURS PRN
Qty: 8 G | Refills: 0 | Status: ON HOLD | OUTPATIENT
Start: 2019-08-05 | End: 2021-12-09

## 2019-08-05 RX ORDER — METHYLPREDNISOLONE 4 MG/1
TABLET ORAL
Qty: 21 TABLET | Refills: 0 | Status: SHIPPED | OUTPATIENT
Start: 2019-08-05 | End: 2020-06-22

## 2019-08-05 RX ORDER — AZITHROMYCIN 250 MG/1
TABLET, FILM COATED ORAL
Qty: 6 TABLET | Refills: 0 | Status: SHIPPED | OUTPATIENT
Start: 2019-08-05 | End: 2020-06-22

## 2019-08-05 NOTE — PROGRESS NOTES
"Subjective   Zuleyma Keating is a 58 y.o. female.     Chief Complaint   Patient presents with   • Cough       She presents with c/o cough for the past 3 weeks. She states she is coughing up clear green thick sputum. She states she is wheezing and a little short of breath. She c/o a little sharp pain in her left chest wall sometimes. She denies fever and chills. She c/o runny nose. She states she has been taking nyquil at night for the cough.         The following portions of the patient's history were reviewed and updated as appropriate: allergies, current medications, past family history, past medical history, past social history, past surgical history and problem list.    Review of Systems   Constitutional: Negative for fatigue, fever and unexpected weight change.   HENT: Positive for rhinorrhea. Negative for ear pain and sore throat.    Eyes: Negative for discharge, itching and visual disturbance.   Respiratory: Positive for cough, shortness of breath and wheezing. Negative for chest tightness.    Cardiovascular: Positive for chest pain. Negative for palpitations and leg swelling.   Gastrointestinal: Negative for abdominal pain, blood in stool, constipation, diarrhea, nausea and vomiting.   Endocrine: Negative for cold intolerance and heat intolerance.   Genitourinary: Negative for dysuria and hematuria.   Musculoskeletal: Negative for arthralgias and myalgias.   Skin: Negative for color change.   Allergic/Immunologic: Positive for environmental allergies.   Neurological: Negative for dizziness, seizures, syncope and headaches.   Hematological: Negative for adenopathy.   Psychiatric/Behavioral: Negative for suicidal ideas. The patient is nervous/anxious.        Objective     /68 (BP Location: Left arm, Patient Position: Sitting, Cuff Size: Adult)   Pulse 105   Temp 97.8 °F (36.6 °C) (Oral)   Ht 162.6 cm (64.02\")   Wt 80.1 kg (176 lb 8 oz)   LMP  (LMP Unknown)   SpO2 96%   BMI 30.28 kg/m² "     Physical Exam   Constitutional: She is oriented to person, place, and time. Vital signs are normal. She appears well-developed and well-nourished. No distress.   HENT:   Head: Normocephalic and atraumatic.   Right Ear: Hearing, tympanic membrane, external ear and ear canal normal.   Left Ear: Hearing, tympanic membrane, external ear and ear canal normal.   Nose: Nose normal. Right sinus exhibits no maxillary sinus tenderness and no frontal sinus tenderness. Left sinus exhibits no maxillary sinus tenderness and no frontal sinus tenderness.   Mouth/Throat: Uvula is midline, oropharynx is clear and moist and mucous membranes are normal.   Eyes: Conjunctivae and lids are normal. Pupils are equal, round, and reactive to light.   Neck: Trachea normal. Neck supple. No tracheal tenderness present. No tracheal deviation present. No thyromegaly present.   Cardiovascular: Normal rate, regular rhythm, S1 normal, S2 normal, normal heart sounds and intact distal pulses. Exam reveals no gallop and no friction rub.   No murmur heard.  Pulmonary/Chest: Effort normal. No respiratory distress. She has decreased breath sounds in the left lower field. She has rhonchi in the right lower field.   Abdominal: Soft. Normal appearance and bowel sounds are normal. She exhibits no distension. There is no tenderness.   Musculoskeletal: She exhibits no edema.   Neurological: She is alert and oriented to person, place, and time.   Skin: Skin is warm and dry. She is not diaphoretic.   Psychiatric: She has a normal mood and affect. Her speech is normal and behavior is normal. Judgment and thought content normal. Cognition and memory are normal.       Assessment/Plan     Problem List Items Addressed This Visit     None      Visit Diagnoses     Acute bronchitis, unspecified organism    -  Primary    Relevant Medications    azithromycin (ZITHROMAX Z-NICOLE) 250 MG tablet    methylPREDNISolone (MEDROL, NICOLE,) 4 MG tablet    albuterol sulfate  (90  Base) MCG/ACT inhaler    Other Relevant Orders    XR Chest PA & Lateral          Plan: Get chest xray to look for cause of cough. Z-pack, medrol dose pack, and albuterol inhaler ordered for bronchitis. Follow up next week if no improvement.     Patient's Body mass index is 30.28 kg/m². BMI is above normal parameters. Recommendations include: educational material.   (Normal BMI:  18.5-24.9, OW 25-29.9, Obesity 30 or greater)        Understands disease processes and need for medications.  Understands reasons for urgent and emergent care.  Patient (& family) verbalized agreement for treatment plan.   Emotional support and active listening provided.  Patient provided time to verbalize feelings.               This document has been electronically signed by FABIOLA Zhong   August 5, 2019 4:06 PM

## 2019-08-05 NOTE — PATIENT INSTRUCTIONS

## 2019-08-26 ENCOUNTER — OFFICE VISIT (OUTPATIENT)
Dept: OBSTETRICS AND GYNECOLOGY | Facility: CLINIC | Age: 59
End: 2019-08-26

## 2019-08-26 VITALS
DIASTOLIC BLOOD PRESSURE: 78 MMHG | BODY MASS INDEX: 29.88 KG/M2 | WEIGHT: 175 LBS | SYSTOLIC BLOOD PRESSURE: 112 MMHG | HEIGHT: 64 IN

## 2019-08-26 DIAGNOSIS — Z46.89 PESSARY MAINTENANCE: Primary | ICD-10-CM

## 2019-08-26 PROCEDURE — 99213 OFFICE O/P EST LOW 20 MIN: CPT | Performed by: OBSTETRICS & GYNECOLOGY

## 2019-08-26 NOTE — PROGRESS NOTES
"Subjective   Chief Complaint   Patient presents with   • Follow-up     pt here for pessary maintenance     Zuleyma Keating is a 58 y.o. year old who presents to be seen for follow-up of her pessary.    Overall she is satisfied with how this pessary is doing.  She is not aware of it being present.  · She is not removing the pessary herself.  · She is able to empty were bladder without difficulty.  · There is not significant urinary incontinence.  She does not have trouble with urinary urgency or frequency.  · There is not significant vaginal discharge present.  She is using estrogen cream  1 times per week to help decrease her vaginal discharge.  · She is not having difficulty with bowel function.     OTHER THINGS SHE WANTS TO DISCUSS TODAY:  Nothing else    The following portions of the patient's history were reviewed and updated as appropriate:current medications and allergies    Review of Systems  Constitutional POS: nothing reported    NEG: anorexia or night sweats   Genitourinary POS: nothing reported       NEG: dysuria or hematuria   Gastointestinal POS: nothing reported    NEG: bloating, change in bowel habits, melena or reflux symptoms   Integument POS: nothing reported    NEG: moles that are changing in size, shape, color or rashes        Objective   /78   Ht 162.6 cm (64\")   Wt 79.4 kg (175 lb)   LMP  (LMP Unknown)   Breastfeeding? No   BMI 30.04 kg/m²     General:  well developed; well nourished  no acute distress   Pelvis: Clinical staff was present for exam  External genitalia:  normal appearance of the external genitalia including Bartholin's and [place]'s glands.  :  urethral meatus normal;  Vaginal: normal pink mucosa without prolapse or lesions; adequate support with pessary in place; no area of erosion or excess granulation tissue seen after pessary removed;  Cervix:  normal appearance.     Lab Review   No data reviewed    Imaging   No data reviewed        Assessment   1. Symptomatic " prolapse - well controlled with current pessary.     Plan   1. Her pessary was removed, cleaned and replaced.  2. The importance of keeping all planned follow-up and taking all medications as prescribed was emphasized.  3. Follow up for annual exam and pessary check up in 2 months    No orders of the defined types were placed in this encounter.         This note was electronically signed.    Lucía Walker MD  August 26, 2019    Note: Speech recognition transcription software may have been used to create portions of this document.  An attempt at proofreading has been made but errors in transcription could still be present.

## 2019-10-22 ENCOUNTER — OFFICE VISIT (OUTPATIENT)
Dept: OBSTETRICS AND GYNECOLOGY | Facility: CLINIC | Age: 59
End: 2019-10-22

## 2019-10-22 VITALS
SYSTOLIC BLOOD PRESSURE: 112 MMHG | HEIGHT: 64 IN | BODY MASS INDEX: 30.05 KG/M2 | WEIGHT: 176 LBS | DIASTOLIC BLOOD PRESSURE: 80 MMHG

## 2019-10-22 DIAGNOSIS — Z01.419 WELL WOMAN EXAM: Primary | ICD-10-CM

## 2019-10-22 DIAGNOSIS — Z46.89 PESSARY MAINTENANCE: ICD-10-CM

## 2019-10-22 PROCEDURE — 99396 PREV VISIT EST AGE 40-64: CPT | Performed by: OBSTETRICS & GYNECOLOGY

## 2019-10-22 NOTE — PROGRESS NOTES
"Subjective   Chief Complaint   Patient presents with   • Gynecologic Exam     pt here for annual. last pap 3/14/17 negative, mammo 3/26/19 benign. pt states that she thinks that her pessary may have moved some.     Zuleyma Keating is a 58 y.o. year old  menopausal female presenting to be seen for her annual exam.  This past year she has not been on hormone replacement therapy.  There has not been vaginal bleeding in the last 12 months.  Menopausal symptoms are present (hot flashes) but not affecting her quality of life .    SEXUAL Hx:  She is currently sexually active.  In the past year there there has been NO new sexual partners.    Condoms are never used.  She would not like to be screened for STD's at today's exam.  M[place] is painful: no  HEALTH Hx:  She exercises regularly: yes.  She wears her seat belt: yes.  She has concerns about domestic violence: no.  She has noticed changes in height: no.  OTHER THINGS SHE WANTS TO DISCUSS TODAY:  She thinks her pessary may be coming out some.    The following portions of the patient's history were reviewed and updated as appropriate:problem list, current medications, allergies, past family history, past medical history, past social history and past surgical history.    Social History    Tobacco Use      Smoking status: Never Smoker      Smokeless tobacco: Never Used      Review of Systems  Constitutional POS: nothing reported    NEG: anorexia or night sweats   Genitourinary POS: nothing reported    NEG: dysuria or hematuria      Gastointestinal POS: nothing reported    NEG: bloating, change in bowel habits, melena or reflux symptoms   Integument POS: nothing reported    NEG: moles that are changing in size, shape, color or rashes   Breast POS: nothing reported    NEG: persistent breast lump, skin dimpling or nipple discharge        Objective   /80   Ht 162.6 cm (64\")   Wt 79.8 kg (176 lb)   LMP  (LMP Unknown)   Breastfeeding? No   BMI 30.21 " kg/m²     General:  well developed; well nourished  no acute distress   Skin:  No suspicious lesions seen   Thyroid: normal to inspection and palpation   Breasts:  Examined in supine position  Symmetric without masses or skin dimpling  Nipples normal without inversion, lesions or discharge  There are no palpable axillary nodes   Abdomen: soft, non-tender; no masses  no umbilical or inguinal hernias are present  no hepato-splenomegaly   Pelvis: Clinical staff was present for exam  External genitalia:  normal appearance of the external genitalia including Bartholin's and [place]'s glands.  :  urethral meatus normal;  Vaginal:  normal pink mucosa without prolapse or lesions. small ulceration anterior vagina  Cervix:  normal appearance.  Uterus:  normal size, shape and consistency.  Adnexa:  non palpable bilaterally.  Rectal:  anus visually normal appearing. recto-vaginal exam unremarkable and confirms findings; Pessary removed easily, cleaned and replaced        Assessment   1. Normal GYN exam in menopause  2. She is up to date on all relevant gynecologic and colorectal screenings   3. Pelvic organ prolapse maintained with pessary - small ulceration seen today on anterior vagina from presence of pessary.      Plan   1. Pap and HPV were done today.  If she does not receive the results of the Pap within 2 weeks  time, she was instructed to call to find out the results.  I explained to Zuleyma that the recommendations for Pap smear interval in a low risk patient's has lengthened to 5 years time if both cytology and HPV testing were normal.  I encouraged her to be seen yearly for a full physical exam including breast and pelvic exam even during the off years when PAP's will not be performed.  2. She was encouraged to get yearly mammograms.  She should report any palpable breast lump(s) or skin changes regardless of mammographic findings.  I explained to Zuleyma that notification regarding her mammogram results will come from  the center performing the study.  Our office will not be routinely calling with mammogram results.  It is her responsibility to make sure that the results from the mammogram are communicated to her by the breast center.  If she has any questions about the results, she is welcome to call our office anytime. She is due in March 2020 for annual screening  3. Colonoscopy due 2024.   4. DEXA at age 60-65  5. Use vaginal estrogen cream nightly 0.5 in vagina for 1-2 weeks then twice weekly given ulceration seen today on anterior vagina  6. Refill for vaginal premarin   7. The importance of keeping all planned follow-up and taking all medications as prescribed was emphasized.  8. Follow up for annual exam in one year and in 6 weeks for pessary check up - will need to do speculum exam again.     New Medications Ordered This Visit   Medications   • conjugated estrogens (PREMARIN) 0.625 MG/GM vaginal cream     Sig: Insert  into the vagina Daily. Insert 0.5g nightly in the vagina for 2 weeks then twice weekly     Dispense:  30 g     Refill:  0          This note was electronically signed.    Lucía Walker MD  October 22, 2019    Note: Speech recognition transcription software may have been used to create portions of this document.  An attempt at proofreading has been made but errors in transcription could still be present.

## 2019-12-03 ENCOUNTER — OFFICE VISIT (OUTPATIENT)
Dept: OBSTETRICS AND GYNECOLOGY | Facility: CLINIC | Age: 59
End: 2019-12-03

## 2019-12-03 VITALS
HEIGHT: 64 IN | BODY MASS INDEX: 30.93 KG/M2 | WEIGHT: 181.2 LBS | SYSTOLIC BLOOD PRESSURE: 120 MMHG | DIASTOLIC BLOOD PRESSURE: 78 MMHG

## 2019-12-03 DIAGNOSIS — T83.89XD VAGINAL EROSION SECONDARY TO PESSARY USE, SUBSEQUENT ENCOUNTER: ICD-10-CM

## 2019-12-03 DIAGNOSIS — Z46.89 ENCOUNTER FOR PESSARY MAINTENANCE: Primary | ICD-10-CM

## 2019-12-03 DIAGNOSIS — N89.8 VAGINAL EROSION SECONDARY TO PESSARY USE, SUBSEQUENT ENCOUNTER: ICD-10-CM

## 2019-12-03 DIAGNOSIS — N81.4 CYSTOCELE WITH PROLAPSE: ICD-10-CM

## 2019-12-03 PROCEDURE — 99213 OFFICE O/P EST LOW 20 MIN: CPT | Performed by: OBSTETRICS & GYNECOLOGY

## 2019-12-03 NOTE — PROGRESS NOTES
"Subjective   Chief Complaint   Patient presents with   • Follow-up     pt here for a 6 week pessary check.      Zuleyma Keating is a 58 y.o. year old who presents to be seen for follow-up of her pessary.    Overall she is satisfied with how this pessary is doing.  She is not aware of it being present.  · She is not removing the pessary herself.  · She is able to empty were bladder without difficulty.  · There is not significant urinary incontinence.  She does not have trouble with urinary urgency or frequency.  · There is not significant vaginal discharge present.  She is using estrogen cream  2 times per week to help decrease her vaginal discharge.  · She is not having difficulty with bowel function.     OTHER THINGS SHE WANTS TO DISCUSS TODAY:  Nothing else    The following portions of the patient's history were reviewed and updated as appropriate:current medications and allergies    Review of Systems  Constitutional POS: nothing reported    NEG: anorexia or night sweats   Genitourinary POS: nothing reported       NEG: dysuria or hematuria   Gastointestinal POS: nothing reported    NEG: bloating, change in bowel habits, melena or reflux symptoms   Integument POS: nothing reported    NEG: moles that are changing in size, shape, color or rashes        Objective   /78   Ht 162.6 cm (64\")   Wt 82.2 kg (181 lb 3.2 oz)   LMP  (LMP Unknown)   Breastfeeding? No   BMI 31.10 kg/m²     General:  well developed; well nourished  no acute distress   Pelvis: Clinical staff was present for exam  External genitalia:  normal appearance of the external genitalia including Bartholin's and [place]'s glands.  :  urethral meatus normal;  Vaginal: atrophic mucosal changes are present; adequate support with pessary in place; some erosion and excess granulation tissue present on anterior and posterior aspect of vagina  Cervix:  normal appearance.       Lab Review   No data reviewed    Imaging   No data reviewed      "   Assessment   1. Symptomatic prolapse with primarily grade 2 cystocele- well controlled with current pessary. Ring with support #5  2. Vaginal erosion from pessary      Plan   1. Her pessary was removed, cleaned   2. Recommended continued use of estrogen cream twice weekly.   3. Will take a break from pessary for 4 weeks given #2 and then re-assess. Discussed possibly trying a smaller size  4. The importance of keeping all planned follow-up and taking all medications as prescribed was emphasized.  5. Follow up for pessary recheck in 4 weeks    No orders of the defined types were placed in this encounter.         This note was electronically signed.    Lucía Walker MD  December 3, 2019    Note: Speech recognition transcription software may have been used to create portions of this document.  An attempt at proofreading has been made but errors in transcription could still be present.

## 2019-12-26 ENCOUNTER — TELEPHONE (OUTPATIENT)
Dept: OBSTETRICS AND GYNECOLOGY | Facility: CLINIC | Age: 59
End: 2019-12-26

## 2019-12-26 NOTE — TELEPHONE ENCOUNTER
Phoned pt to reschedule her appt with Dr Walker on 1-2-20 for the next week. Message left on  asking for return call.

## 2020-01-28 ENCOUNTER — TRANSCRIBE ORDERS (OUTPATIENT)
Dept: ADMINISTRATIVE | Facility: HOSPITAL | Age: 60
End: 2020-01-28

## 2020-01-28 ENCOUNTER — OFFICE VISIT (OUTPATIENT)
Dept: OBSTETRICS AND GYNECOLOGY | Facility: CLINIC | Age: 60
End: 2020-01-28

## 2020-01-28 VITALS
WEIGHT: 183.4 LBS | BODY MASS INDEX: 31.31 KG/M2 | SYSTOLIC BLOOD PRESSURE: 122 MMHG | HEIGHT: 64 IN | DIASTOLIC BLOOD PRESSURE: 80 MMHG

## 2020-01-28 DIAGNOSIS — Z46.89 PESSARY MAINTENANCE: Primary | ICD-10-CM

## 2020-01-28 DIAGNOSIS — Z12.31 VISIT FOR SCREENING MAMMOGRAM: Primary | ICD-10-CM

## 2020-01-28 PROCEDURE — 99213 OFFICE O/P EST LOW 20 MIN: CPT | Performed by: OBSTETRICS & GYNECOLOGY

## 2020-01-28 NOTE — PROGRESS NOTES
"Subjective   Chief Complaint   Patient presents with   • Follow-up     pessary insertion if area is healed.     Zuleyma Keating is a 59 y.o. year old who presents to be seen for follow-up of her pessary. It was removed in early December due to a vaginal erosion.  She has been using estrogen cream and is here for replacement if the erosion is gone.    Overall she is satisfied with how this pessary is doing.  She is not aware of it being present.  · She is not removing the pessary herself.  · She is able to empty were bladder without difficulty.  · There is not significant urinary incontinence.  She does not have trouble with urinary urgency or frequency.  · There is not significant vaginal discharge present.  She is using estrogen cream  2 times per week to help decrease her vaginal discharge.  · She is not having difficulty with bowel function.     OTHER THINGS SHE WANTS TO DISCUSS TODAY:  She has questions about fatigue and having hormones check     The following portions of the patient's history were reviewed and updated as appropriate:current medications and allergies    Review of Systems  Constitutional POS: nothing reported    NEG: anorexia or night sweats   Genitourinary POS: nothing reported       NEG: dysuria or hematuria   Gastointestinal POS: nothing reported    NEG: bloating, change in bowel habits, melena or reflux symptoms   Integument POS: nothing reported    NEG: moles that are changing in size, shape, color or rashes        Objective   /80   Ht 162.6 cm (64\")   Wt 83.2 kg (183 lb 6.4 oz)   LMP  (LMP Unknown)   BMI 31.48 kg/m²     General:  well developed; well nourished  no acute distress   Pelvis: Clinical staff was present for exam  External genitalia:  normal appearance of the external genitalia including Bartholin's and [place]'s glands.  :  urethral meatus normal;  Vaginal: normal pink mucosa without prolapse or lesions; no area of erosion or excess granulation tissue seen after " pessary removed;  Cystocele GRADE 2     Lab Review   No data reviewed    Imaging   No data reviewed        Assessment   1. Symptomatic prolapse with primarily grade 2 cystocele- well controlled with current pessary. Ring with support #5. No evidence of vaginal erosion.      Plan   1. Her pessary replaced today.   2. Reviewed her normal TSH from her primary care physician office.  Reviewed there is no utility in checking hormones per se unless she is interested in hormone replacement therapy which she has not.  3. The importance of keeping all planned follow-up and taking all medications as prescribed was emphasized.  4. Follow up for pessary recheck in 6-8 weeks per patient request.     No orders of the defined types were placed in this encounter.         This note was electronically signed.    Lucía Walker MD  January 28, 2020    Note: Speech recognition transcription software may have been used to create portions of this document.  An attempt at proofreading has been made but errors in transcription could still be present.

## 2020-02-04 ENCOUNTER — TELEPHONE (OUTPATIENT)
Dept: OBSTETRICS AND GYNECOLOGY | Facility: CLINIC | Age: 60
End: 2020-02-04

## 2020-02-04 NOTE — TELEPHONE ENCOUNTER
Advised pt that Dr Walker recommends not removing her pessary prior to colonoscopy. Pt verbalized understanding.

## 2020-02-20 RX ORDER — BUPROPION HYDROCHLORIDE 150 MG/1
TABLET, EXTENDED RELEASE ORAL
Qty: 60 TABLET | Refills: 2 | OUTPATIENT
Start: 2020-02-20

## 2020-03-06 RX ORDER — ESTRADIOL 0.1 MG/G
2 CREAM VAGINAL DAILY
Qty: 42.5 G | Refills: 1 | Status: SHIPPED | OUTPATIENT
Start: 2020-03-06 | End: 2021-07-27

## 2020-03-06 NOTE — TELEPHONE ENCOUNTER
Pt called stating that her insurance was not going to pay much for premarin and that it was going to be a few hundred dollars. Pt states that her insurance will pay for estradiol cream and that it will only be $48. Can you please send this to [facility]oval

## 2020-03-07 RX ORDER — BUPROPION HYDROCHLORIDE 150 MG/1
TABLET, EXTENDED RELEASE ORAL
Qty: 60 TABLET | Refills: 2 | Status: SHIPPED | OUTPATIENT
Start: 2020-03-07 | End: 2022-11-10

## 2020-03-10 ENCOUNTER — OFFICE VISIT (OUTPATIENT)
Dept: OBSTETRICS AND GYNECOLOGY | Facility: CLINIC | Age: 60
End: 2020-03-10

## 2020-03-10 VITALS
HEIGHT: 64 IN | SYSTOLIC BLOOD PRESSURE: 122 MMHG | BODY MASS INDEX: 31.58 KG/M2 | DIASTOLIC BLOOD PRESSURE: 78 MMHG | WEIGHT: 185 LBS

## 2020-03-10 DIAGNOSIS — Z46.89 ENCOUNTER FOR PESSARY MAINTENANCE: Primary | ICD-10-CM

## 2020-03-10 DIAGNOSIS — R35.0 URINARY FREQUENCY: ICD-10-CM

## 2020-03-10 LAB
BACTERIA UR QL AUTO: ABNORMAL /HPF
BILIRUB UR QL STRIP: NEGATIVE
CLARITY UR: CLEAR
COLOR UR: YELLOW
GLUCOSE UR STRIP-MCNC: NEGATIVE MG/DL
HGB UR QL STRIP.AUTO: NEGATIVE
HYALINE CASTS UR QL AUTO: ABNORMAL /LPF
KETONES UR QL STRIP: NEGATIVE
LEUKOCYTE ESTERASE UR QL STRIP.AUTO: ABNORMAL
NITRITE UR QL STRIP: NEGATIVE
PH UR STRIP.AUTO: 8 [PH] (ref 5–8)
PROT UR QL STRIP: NEGATIVE
RBC # UR: ABNORMAL /HPF
REF LAB TEST METHOD: ABNORMAL
SP GR UR STRIP: 1.02 (ref 1–1.03)
SQUAMOUS #/AREA URNS HPF: ABNORMAL /HPF
UROBILINOGEN UR QL STRIP: ABNORMAL
WBC UR QL AUTO: ABNORMAL /HPF

## 2020-03-10 PROCEDURE — 81001 URINALYSIS AUTO W/SCOPE: CPT | Performed by: OBSTETRICS & GYNECOLOGY

## 2020-03-10 PROCEDURE — 99213 OFFICE O/P EST LOW 20 MIN: CPT | Performed by: OBSTETRICS & GYNECOLOGY

## 2020-03-10 PROCEDURE — 87086 URINE CULTURE/COLONY COUNT: CPT | Performed by: OBSTETRICS & GYNECOLOGY

## 2020-03-10 RX ORDER — IBUPROFEN 800 MG/1
TABLET ORAL
COMMUNITY
Start: 2020-03-05 | End: 2020-06-22

## 2020-03-10 RX ORDER — ESCITALOPRAM OXALATE 5 MG/1
5 TABLET ORAL DAILY
COMMUNITY
Start: 2020-03-06 | End: 2020-06-22

## 2020-03-10 RX ORDER — TIZANIDINE 4 MG/1
4 TABLET ORAL DAILY
COMMUNITY
Start: 2020-02-25 | End: 2020-06-22

## 2020-03-10 NOTE — PROGRESS NOTES
"Subjective   Chief Complaint   Patient presents with   • Follow-up     pessary maintenance     Zuleyma Keating is a 59 y.o. year old who presents to be seen for follow-up of her pessary.    Overall she is satisfied with how this pessary is doing.  She is not aware of it being present.  · She is not removing the pessary herself.  · She is able to empty were bladder without difficulty.  · There is not significant urinary incontinence.  She does have trouble with urinary frequency.  · There is not significant vaginal discharge present.  She is using estrogen cream  2 times per week to help decrease her vaginal discharge.  · She is not having difficulty with bowel function.     OTHER THINGS SHE WANTS TO DISCUSS TODAY:  Nothing else    The following portions of the patient's history were reviewed and updated as appropriate:current medications and allergies    Review of Systems  Constitutional POS: nothing reported    NEG: anorexia or night sweats   Genitourinary POS: nothing reported       NEG: dysuria or hematuria   Gastointestinal POS: nothing reported    NEG: bloating, change in bowel habits, melena or reflux symptoms   Integument POS: nothing reported    NEG: moles that are changing in size, shape, color or rashes        Objective   /78   Ht 162.6 cm (64\")   Wt 83.9 kg (185 lb)   LMP  (LMP Unknown)   Breastfeeding No   BMI 31.76 kg/m²     General:  well developed; well nourished  no acute distress   Pelvis: Clinical staff was present for exam  External genitalia:  normal appearance of the external genitalia including Bartholin's and [place]'s glands.  :  urethral meatus normal;  Vaginal: normal pink mucosa without prolapse or lesions; adequate support with pessary in place; no area of erosion or excess granulation tissue seen after pessary removed;  Cervix:  normal appearance.     Lab Review   No data reviewed    Imaging   No data reviewed        Assessment   1. Symptomatic prolapse - well controlled " with current pessary.  2. Urinary frequency - new symptoms      Plan   1. Her pessary was removed, cleaned and replaced.  The following tests were ordered today: UA with culture if indicated.  It was explained to Zuleyma that all lab test should be back within the one week after they are performed. She will be notified about the results, regardless of the findings. If she has not been contacted by the office within 2 weeks after the test has been performed, it is her responsibility to contact us to learn about her results.  The importance of keeping all planned follow-up and taking all medications as prescribed was emphasized.  Follow up for pessary recheck in 7 weeks per patient request     No orders of the defined types were placed in this encounter.         This note was electronically signed.    Lucía Walker MD  March 10, 2020    Note: Speech recognition transcription software may have been used to create portions of this document.  An attempt at proofreading has been made but errors in transcription could still be present.

## 2020-03-12 LAB — BACTERIA SPEC AEROBE CULT: NORMAL

## 2020-03-27 ENCOUNTER — APPOINTMENT (OUTPATIENT)
Dept: MAMMOGRAPHY | Facility: HOSPITAL | Age: 60
End: 2020-03-27

## 2020-04-07 ENCOUNTER — TELEPHONE (OUTPATIENT)
Dept: OBSTETRICS AND GYNECOLOGY | Facility: CLINIC | Age: 60
End: 2020-04-07

## 2020-04-07 RX ORDER — FLUCONAZOLE 150 MG/1
150 TABLET ORAL ONCE
Qty: 1 TABLET | Refills: 0 | Status: SHIPPED | OUTPATIENT
Start: 2020-04-07 | End: 2020-04-07

## 2020-04-07 NOTE — TELEPHONE ENCOUNTER
Pt stated she notice the other day oming out the shower her outer lips of her vaginal begin to itch, the next day she notice a rash, Pt stated she believe its yeast I advised pt to get moniasts cream, and if it did not clear up to call office to make appt.

## 2020-04-10 ENCOUNTER — TELEPHONE (OUTPATIENT)
Dept: OBSTETRICS AND GYNECOLOGY | Facility: CLINIC | Age: 60
End: 2020-04-10

## 2020-04-10 NOTE — TELEPHONE ENCOUNTER
Pt calling in stating that she is having some urinary leakage that is worse at night when she lays down. Pt asking if it could be that her pessary is pushing on her bladder? Please advise

## 2020-04-10 NOTE — TELEPHONE ENCOUNTER
Yes sometimes this can happen.  If this is becoming very bothersome for her or she has any dysuria or hematuria she can try and remove the pessary herself.  If it becomes very bothersome or she has those symptoms I am happy to see her in person to do a urine culture and remove the pessary and replaced depending on her physical exam that day.    Lucía Walker MD

## 2020-04-10 NOTE — TELEPHONE ENCOUNTER
This patient's pessary checkup can be moved out to late May or June if patient is comfortable with waiting to have her pessary exchanged to that time given the current coronavirus pandemic.  Thanks,   Lucía Walker MD

## 2020-04-14 NOTE — TELEPHONE ENCOUNTER
Called pt; pt advised. Pt states its just irritating at night with the urinary leakage. Pt states she will attempt to remove it, but stated she may have an issue replacing it. Advised pt if she has an issue with replacing to call our office. Pt verbalized understanding.

## 2020-05-14 ENCOUNTER — APPOINTMENT (OUTPATIENT)
Dept: MAMMOGRAPHY | Facility: HOSPITAL | Age: 60
End: 2020-05-14

## 2020-06-19 ENCOUNTER — TRANSCRIBE ORDERS (OUTPATIENT)
Dept: MAMMOGRAPHY | Facility: HOSPITAL | Age: 60
End: 2020-06-19

## 2020-06-19 DIAGNOSIS — Z12.31 VISIT FOR SCREENING MAMMOGRAM: Primary | ICD-10-CM

## 2020-06-22 ENCOUNTER — HOSPITAL ENCOUNTER (OUTPATIENT)
Dept: MAMMOGRAPHY | Facility: HOSPITAL | Age: 60
Discharge: HOME | End: 2020-06-22
Admitting: OBSTETRICS & GYNECOLOGY

## 2020-06-22 ENCOUNTER — APPOINTMENT (OUTPATIENT)
Dept: MAMMOGRAPHY | Facility: HOSPITAL | Age: 60
End: 2020-06-22

## 2020-06-22 ENCOUNTER — OFFICE VISIT (OUTPATIENT)
Dept: OBSTETRICS AND GYNECOLOGY | Facility: CLINIC | Age: 60
End: 2020-06-22

## 2020-06-22 VITALS — WEIGHT: 189.4 LBS | HEIGHT: 64 IN | BODY MASS INDEX: 32.33 KG/M2

## 2020-06-22 DIAGNOSIS — N81.10 PELVIC ORGAN PROLAPSE QUANTIFICATION STAGE 3 CYSTOCELE: Primary | ICD-10-CM

## 2020-06-22 DIAGNOSIS — Z12.31 VISIT FOR SCREENING MAMMOGRAM: ICD-10-CM

## 2020-06-22 PROCEDURE — 77067 SCR MAMMO BI INCL CAD: CPT | Performed by: RADIOLOGY

## 2020-06-22 PROCEDURE — 99212 OFFICE O/P EST SF 10 MIN: CPT | Performed by: OBSTETRICS & GYNECOLOGY

## 2020-06-22 PROCEDURE — 77067 SCR MAMMO BI INCL CAD: CPT

## 2020-06-22 PROCEDURE — 77063 BREAST TOMOSYNTHESIS BI: CPT | Performed by: RADIOLOGY

## 2020-06-22 PROCEDURE — 77063 BREAST TOMOSYNTHESIS BI: CPT

## 2020-06-22 NOTE — PROGRESS NOTES
"Subjective   Chief Complaint   Patient presents with   • Follow-up     pessary maintenance. pt states that she took her pessary out and that she feels like her bladder does not leak with it out.      Zuleyma Keating is a 59 y.o. year old .  No LMP recorded (lmp unknown). Patient is postmenopausal.  She presents to be seen because of history of pelvic organ prolapse.  She was initially scheduled to come back in April for a pessary cleaning and replacement.  However she reports she removed it prior to that rescheduled appointment due to the pandemic.  She reports actually she feels pretty well and has not been leaking urine at night and wants to try without the pessary.  She does have questions about what actually has been prolapsing.  She reports she has gained some weight during the pandemic but is also been doing Keegle exercises regularly.    OTHER THINGS SHE WANTS TO DISCUSS TODAY:  Nothing else    The following portions of the patient's history were reviewed and updated as appropriate:current medications and allergies    Social History    Tobacco Use      Smoking status: Never Smoker      Smokeless tobacco: Never Used    Review of Systems  Constitutional POS: nothing reported    NEG: anorexia or night sweats   Genitourinary POS: nothing reported    NEG: dysuria or hematuria   Gastointestinal POS: nothing reported    NEG: bloating, change in bowel habits, melena or reflux symptoms   Integument POS: nothing reported    NEG: moles that are changing in size, shape, color or rashes   Breast POS: nothing reported    NEG: persistent breast lump, skin dimpling or nipple discharge         Objective   Ht 162.6 cm (64\")   Wt 85.9 kg (189 lb 6.4 oz)   LMP  (LMP Unknown)   Breastfeeding No   BMI 32.51 kg/m²     General:  well developed; well nourished  no acute distress                                 Lab Review   No data reviewed    Imaging   No data reviewed        Assessment   1. Pelvic organ prolapse, " anterior grade 3 and apical grade 2 on prior exams     Plan   1. Reviewed with patient it is okay to continue expectant management without pessary.  Recommended she keep the pessary at home that way if she needs to use it again in the future.  Reviews options in the future she did have issues would be consideration of pessary use again versus surgical management through the uro-gynecologist at [facility].  2. The importance of keeping all planned follow-up and taking all medications as prescribed was emphasized.  3. Follow up for annual exam as already scheduled in October 2021    No orders of the defined types were placed in this encounter.      Total time spent today with Zuleyma  was 10minutes.  Of this time, > 50% was spent face-to-face time coordinating care, answering her questions and counseling regarding pathophysiology of her presenting problem along with plans for any diagnositc work-up and treatment.      This note was electronically signed.    Lucía Walker MD  June 22, 2020    Note: Speech recognition transcription software may have been used to create portions of this document.  An attempt at proofreading has been made but errors in transcription could still be present.

## 2020-06-30 ENCOUNTER — TELEPHONE (OUTPATIENT)
Dept: OBSTETRICS AND GYNECOLOGY | Facility: CLINIC | Age: 60
End: 2020-06-30

## 2020-06-30 DIAGNOSIS — Z91.89 AT HIGH RISK FOR BREAST CANCER: Primary | ICD-10-CM

## 2020-06-30 NOTE — TELEPHONE ENCOUNTER
Please inform patient her mammogram was normal. However, they recommended that she also do a screening breast MRI each year to where she is having breast imaging every 6 months given higher risk for breast cancer with first degree relative having the diagnosis. Order placed and central scheduling should call her to schedule this around December this year.     Thanks,   Lucía Walker MD    Orders Placed This Encounter   Procedures   • MRI Breast Bilateral Screening With & Without Contrast     Standing Status:   Future     Standing Expiration Date:   12/27/2020

## 2020-09-25 NOTE — TELEPHONE ENCOUNTER
Pt states she has a colonscopy next week would like to know does she need to come and get the pessary out.   reactions to food/raspberries

## 2021-01-07 ENCOUNTER — HOSPITAL ENCOUNTER (OUTPATIENT)
Dept: MRI IMAGING | Facility: HOSPITAL | Age: 61
Discharge: HOME | End: 2021-01-07
Admitting: OBSTETRICS & GYNECOLOGY

## 2021-01-07 DIAGNOSIS — Z91.89 AT HIGH RISK FOR BREAST CANCER: ICD-10-CM

## 2021-01-07 PROCEDURE — 0 GADOBENATE DIMEGLUMINE 529 MG/ML SOLUTION: Performed by: OBSTETRICS & GYNECOLOGY

## 2021-01-07 PROCEDURE — 77049 MRI BREAST C-+ W/CAD BI: CPT

## 2021-01-07 PROCEDURE — A9577 INJ MULTIHANCE: HCPCS | Performed by: OBSTETRICS & GYNECOLOGY

## 2021-01-07 PROCEDURE — C8937 CAD BREAST MRI: HCPCS

## 2021-01-07 PROCEDURE — 77049 MRI BREAST C-+ W/CAD BI: CPT | Performed by: RADIOLOGY

## 2021-01-07 RX ADMIN — GADOBENATE DIMEGLUMINE 20 ML: 529 INJECTION, SOLUTION INTRAVENOUS at 13:53

## 2021-01-11 ENCOUNTER — TELEPHONE (OUTPATIENT)
Dept: MRI IMAGING | Facility: HOSPITAL | Age: 61
End: 2021-01-11

## 2021-01-11 NOTE — TELEPHONE ENCOUNTER
Called patient with MRI Breast results. Recommended yearly high risk screening and an US to confirm the presence of gallstones. Pt expressed understanding and was encouraged to call with any further questions or concerns.

## 2021-02-10 ENCOUNTER — IMMUNIZATION (OUTPATIENT)
Dept: VACCINE CLINIC | Facility: HOSPITAL | Age: 61
End: 2021-02-10

## 2021-02-10 PROCEDURE — 91300 HC SARSCOV02 VAC 30MCG/0.3ML IM: CPT | Performed by: INTERNAL MEDICINE

## 2021-02-10 PROCEDURE — 0001A: CPT | Performed by: INTERNAL MEDICINE

## 2021-02-24 ENCOUNTER — TRANSCRIBE ORDERS (OUTPATIENT)
Dept: ADMINISTRATIVE | Facility: HOSPITAL | Age: 61
End: 2021-02-24

## 2021-02-24 DIAGNOSIS — R10.84 ABDOMINAL PAIN, GENERALIZED: Primary | ICD-10-CM

## 2021-03-01 ENCOUNTER — HOSPITAL ENCOUNTER (OUTPATIENT)
Dept: ULTRASOUND IMAGING | Facility: HOSPITAL | Age: 61
Discharge: HOME | End: 2021-03-01
Admitting: FAMILY MEDICINE

## 2021-03-01 DIAGNOSIS — R10.84 ABDOMINAL PAIN, GENERALIZED: ICD-10-CM

## 2021-03-01 PROCEDURE — 76700 US EXAM ABDOM COMPLETE: CPT

## 2021-03-01 PROCEDURE — 76700 US EXAM ABDOM COMPLETE: CPT | Performed by: RADIOLOGY

## 2021-03-03 ENCOUNTER — IMMUNIZATION (OUTPATIENT)
Dept: VACCINE CLINIC | Facility: HOSPITAL | Age: 61
End: 2021-03-03

## 2021-03-03 PROCEDURE — 91300 HC SARSCOV02 VAC 30MCG/0.3ML IM: CPT | Performed by: INTERNAL MEDICINE

## 2021-03-03 PROCEDURE — 0002A: CPT | Performed by: INTERNAL MEDICINE

## 2021-03-29 ENCOUNTER — TRANSCRIBE ORDERS (OUTPATIENT)
Dept: ADMINISTRATIVE | Facility: HOSPITAL | Age: 61
End: 2021-03-29

## 2021-03-29 DIAGNOSIS — R10.11 ABDOMINAL PAIN, RIGHT UPPER QUADRANT: Primary | ICD-10-CM

## 2021-04-16 ENCOUNTER — APPOINTMENT (OUTPATIENT)
Dept: NUCLEAR MEDICINE | Facility: HOSPITAL | Age: 61
End: 2021-04-16

## 2021-06-08 ENCOUNTER — TRANSCRIBE ORDERS (OUTPATIENT)
Dept: ADMINISTRATIVE | Facility: HOSPITAL | Age: 61
End: 2021-06-08

## 2021-06-08 DIAGNOSIS — Z12.31 VISIT FOR SCREENING MAMMOGRAM: Primary | ICD-10-CM

## 2021-07-14 ENCOUNTER — HOSPITAL ENCOUNTER (OUTPATIENT)
Dept: MAMMOGRAPHY | Facility: HOSPITAL | Age: 61
Discharge: HOME | End: 2021-07-14
Admitting: OBSTETRICS & GYNECOLOGY

## 2021-07-14 DIAGNOSIS — Z12.31 VISIT FOR SCREENING MAMMOGRAM: ICD-10-CM

## 2021-07-14 PROCEDURE — 77063 BREAST TOMOSYNTHESIS BI: CPT | Performed by: RADIOLOGY

## 2021-07-14 PROCEDURE — 77067 SCR MAMMO BI INCL CAD: CPT | Performed by: RADIOLOGY

## 2021-07-14 PROCEDURE — 77067 SCR MAMMO BI INCL CAD: CPT

## 2021-07-14 PROCEDURE — 77063 BREAST TOMOSYNTHESIS BI: CPT

## 2021-07-27 ENCOUNTER — OFFICE VISIT (OUTPATIENT)
Dept: OBSTETRICS AND GYNECOLOGY | Facility: CLINIC | Age: 61
End: 2021-07-27

## 2021-07-27 VITALS
WEIGHT: 185.6 LBS | HEIGHT: 64 IN | DIASTOLIC BLOOD PRESSURE: 78 MMHG | BODY MASS INDEX: 31.69 KG/M2 | SYSTOLIC BLOOD PRESSURE: 128 MMHG

## 2021-07-27 DIAGNOSIS — Z01.419 WELL WOMAN EXAM WITH ROUTINE GYNECOLOGICAL EXAM: Primary | ICD-10-CM

## 2021-07-27 DIAGNOSIS — Z91.89 AT HIGH RISK FOR BREAST CANCER: ICD-10-CM

## 2021-07-27 PROCEDURE — 99396 PREV VISIT EST AGE 40-64: CPT | Performed by: OBSTETRICS & GYNECOLOGY

## 2021-07-27 RX ORDER — PANTOPRAZOLE SODIUM 40 MG/1
40 TABLET, DELAYED RELEASE ORAL DAILY PRN
COMMUNITY
Start: 2021-06-14

## 2021-07-27 RX ORDER — HYDROCODONE BITARTRATE AND ACETAMINOPHEN 5; 325 MG/1; MG/1
1 TABLET ORAL 4 TIMES DAILY PRN
COMMUNITY
Start: 2021-06-14 | End: 2021-07-27

## 2021-07-27 RX ORDER — MELOXICAM 7.5 MG/1
3.75 TABLET ORAL 2 TIMES DAILY PRN
COMMUNITY
Start: 2021-06-29

## 2021-07-27 NOTE — PROGRESS NOTES
"Subjective   Chief Complaint   Patient presents with   • Gynecologic Exam     annual. last pap 3/14/17 negative, last mammo 21 negative.      Zuleyma Keating is a 60 y.o. year old  menopausal female presenting to be seen for her annual exam.  This past year she has not been on hormone replacement therapy.  There has not been vaginal bleeding in the last 12 months.  Menopausal symptoms are present (hot flashes) but not affecting her quality of life .    SEXUAL Hx:  She is currently sexually active.  In the past year there there has been NO new sexual partners.    Condoms are never used.  She would not like to be screened for STD's at today's exam.  [place] is painful: no  HEALTH Hx:  She exercises regularly: yes.  She wears her seat belt: yes.  She has concerns about domestic violence: no.  OTHER THINGS SHE WANTS TO DISCUSS TODAY:  She reports she still has not been using the pessary and does not think the prolapse is worsened at all.  She is doing well.    The following portions of the patient's history were reviewed and updated as appropriate:problem list, current medications, allergies, past family history, past medical history, past social history and past surgical history.    Social History    Tobacco Use      Smoking status: Never Smoker      Smokeless tobacco: Never Used      Review of Systems  Constitutional POS: nothing reported    NEG: anorexia or night sweats   Genitourinary POS: urinary frequency     NEG: dysuria or hematuria      Gastointestinal POS: nothing reported    NEG: bloating, change in bowel habits, melena or reflux symptoms   Integument POS: nothing reported    NEG: moles that are changing in size, shape, color or rashes   Breast POS: nothing reported    NEG: persistent breast lump, skin dimpling or nipple discharge        Objective   /78   Ht 162.6 cm (64\")   Wt 84.2 kg (185 lb 9.6 oz)   LMP  (LMP Unknown)   BMI 31.86 kg/m²     General:  well developed; well " nourished  no acute distress   Skin:  No suspicious lesions seen   Thyroid: normal to inspection and palpation   Breasts:  Examined in supine position  Symmetric without masses or skin dimpling  Nipples normal without inversion, lesions or discharge  There are no palpable axillary nodes   Abdomen: soft, non-tender; no masses  no umbilical or inguinal hernias are present  no hepato-splenomegaly   Pelvis: Clinical staff was present for exam  External genitalia:  normal appearance of the external genitalia including Bartholin's and [place]'s glands.  :  urethral meatus normal;  Vaginal:  atrophic mucosal changes are present;  Cervix:  normal appearance. stenotic  Uterus:  normal size, shape and consistency.  Adnexa:  non palpable bilaterally.  Rectal:  digital rectal exam not performed; anus visually normal appearing.  Cystocele GRADE 2  Rectocele GRADE 0  Uterine GRADE 2        Assessment   1. Normal GYN exam menopause  2. She is up to date on all relevant gynecologic and colorectal screenings except PAP test     Plan   Pap and HPV were done today.  If she does not receive the results of the Pap within 2 weeks  time, she was instructed to call to find out the results.  I explained to Zuleyma that the recommendations for Pap smear interval in a low risk patient's has lengthened to 5 years time if both cytology and HPV testing were normal.  I encouraged her to be seen yearly for a full physical exam including breast and pelvic exam even during the off years when PAP's will not be performed.  She was encouraged to get yearly mammograms along with yearly breast MRI.  The studies should set up to stagger every 6 months.  She should report any palpable breast lump(s) or skin changes regardless of mammographic findings.  I explained to Zuleyma that notification regarding her mammogram results will come from the center performing the study.  Our office will not be routinely calling with mammogram results.  It is her  responsibility to make sure that the results from the mammogram are communicated to her by the breast center.  If she has any questions about the results, she is welcome to call our office anytime.  Request last colonoscopy and records  Reviewed importance of exercise continuing at least 2-3 times per day.  DEXA at 60-66 y/o  The importance of keeping all planned follow-up and taking all medications as prescribed was emphasized.  Follow up for annual exam in one year    No orders of the defined types were placed in this encounter.         This note was electronically signed.    Lucía Walker MD  July 27, 2021    Note: Speech recognition transcription software may have been used to create portions of this document.  An attempt at proofreading has been made but errors in transcription could still be present.

## 2021-08-09 ENCOUNTER — TELEPHONE (OUTPATIENT)
Dept: OBSTETRICS AND GYNECOLOGY | Facility: CLINIC | Age: 61
End: 2021-08-09

## 2021-08-09 PROBLEM — R87.615 UNSATISFACTORY CERVICAL CYTOLOGY SMEAR: Status: ACTIVE | Noted: 2021-08-09

## 2021-09-28 ENCOUNTER — TELEPHONE (OUTPATIENT)
Dept: OBSTETRICS AND GYNECOLOGY | Facility: CLINIC | Age: 61
End: 2021-09-28

## 2021-09-28 NOTE — TELEPHONE ENCOUNTER
PT IS CALLING SHE WOULD LIKE TO SPEAK TO A NURSE - SOMEONE TO GO OVER HER PAP RESULTS   PLEASE CALL HER

## 2021-10-04 ENCOUNTER — OFFICE VISIT (OUTPATIENT)
Dept: OBSTETRICS AND GYNECOLOGY | Facility: CLINIC | Age: 61
End: 2021-10-04

## 2021-10-04 VITALS
SYSTOLIC BLOOD PRESSURE: 120 MMHG | HEIGHT: 65 IN | DIASTOLIC BLOOD PRESSURE: 90 MMHG | WEIGHT: 184 LBS | BODY MASS INDEX: 30.66 KG/M2

## 2021-10-04 DIAGNOSIS — Z80.3 FAMILY HISTORY OF BREAST CANCER: ICD-10-CM

## 2021-10-04 DIAGNOSIS — R87.615 UNSATISFACTORY CERVICAL CYTOLOGY SMEAR: Primary | ICD-10-CM

## 2021-10-04 DIAGNOSIS — Z91.89 AT HIGH RISK FOR BREAST CANCER: ICD-10-CM

## 2021-10-04 PROCEDURE — 99212 OFFICE O/P EST SF 10 MIN: CPT | Performed by: OBSTETRICS & GYNECOLOGY

## 2021-10-04 RX ORDER — HYDROCODONE BITARTRATE AND ACETAMINOPHEN 5; 325 MG/1; MG/1
1 TABLET ORAL 4 TIMES DAILY PRN
COMMUNITY
Start: 2021-08-31 | End: 2022-11-10

## 2021-10-04 NOTE — PROGRESS NOTES
"Subjective   Chief Complaint   Patient presents with   • Follow-up     repeat pap     Zuleyma Keating is a 60 y.o. year old .  No LMP recorded (lmp unknown). Patient is postmenopausal.  She presents to be seen because of previous nondiagnostic Pap smear.  She also has questions about genetic screening given her mother's history of breast cancer.    OTHER THINGS SHE WANTS TO DISCUSS TODAY:  Nothing else    The following portions of the patient's history were reviewed and updated as appropriate:current medications, allergies and past family history    Social History    Tobacco Use      Smoking status: Never Smoker      Smokeless tobacco: Never Used    Review of Systems  Constitutional POS: nothing reported    NEG: anorexia or night sweats   Genitourinary POS: nothing reported    NEG: dysuria or hematuria   Gastointestinal POS: nothing reported    NEG: bloating, change in bowel habits, melena or reflux symptoms   Integument POS: nothing reported    NEG: moles that are changing in size, shape, color or rashes   Breast POS: nothing reported    NEG: persistent breast lump, skin dimpling or nipple discharge         Objective   /90 (BP Location: Left arm, Patient Position: Sitting, Cuff Size: Adult)   Ht 165.1 cm (65\")   Wt 83.5 kg (184 lb)   LMP  (LMP Unknown)   Breastfeeding No   BMI 30.62 kg/m²     General:  well developed; well nourished  no acute distress   Skin:  Not performed.   Thyroid: not examined   Lungs:  breathing is unlabored   Heart:  Not performed.   Breasts:  Not performed.   Abdomen: Not performed.   Pelvis: Clinical staff was present for exam  External genitalia:  normal appearance of the external genitalia including Bartholin's and [place]and[place]oort's glands.  :  urethral meatus normal;  Vaginal:  atrophic  Cervix:  normal appearance.  Cystocele GRADE 2     Lab Review   PAP    Imaging   No data reviewed        Assessment   1. Non diagnostic pap smear   2. Family history of breast cancer    "   Plan   Pap and HPV were done today.  If she does not receive the results of the Pap within 2 weeks  time, she was instructed to call to find out the results.  I explained to Zuleyma that the recommendations for Pap smear interval in a low risk patient's has lengthened to 5 years time if both cytology and HPV testing were normal.  I encouraged her to be seen yearly for a full physical exam including breast and pelvic exam even during the off years when PAP's will not be performed.  She was encouraged to get yearly mammograms along with yearly breast MRI.  The studies should set up to stagger every 6 months.  She should report any palpable breast lump(s) or skin changes regardless of mammographic findings.  I explained to Zuleyma that notification regarding her mammogram results will come from the center performing the study.  Our office will not be routinely calling with mammogram results.  It is her responsibility to make sure that the results from the mammogram are communicated to her by the breast center.  If she has any questions about the results, she is welcome to call our office anytime.  Amb referral to genetics  The importance of keeping all planned follow-up and taking all medications as prescribed was emphasized.  Follow up for annual exam in one year    No orders of the defined types were placed in this encounter.         This note was electronically signed.    Lucía Walker MD  October 4, 2021    Note: Speech recognition transcription software may have been used to create portions of this document.  An attempt at proofreading has been made but errors in transcription could still be present.

## 2021-11-01 ENCOUNTER — OFFICE VISIT (OUTPATIENT)
Dept: NEUROSURGERY | Facility: CLINIC | Age: 61
End: 2021-11-01

## 2021-11-01 VITALS
HEIGHT: 65 IN | SYSTOLIC BLOOD PRESSURE: 120 MMHG | RESPIRATION RATE: 20 BRPM | WEIGHT: 181 LBS | OXYGEN SATURATION: 98 % | DIASTOLIC BLOOD PRESSURE: 78 MMHG | BODY MASS INDEX: 30.16 KG/M2 | HEART RATE: 84 BPM

## 2021-11-01 DIAGNOSIS — M25.551 RIGHT HIP PAIN: Primary | ICD-10-CM

## 2021-11-01 DIAGNOSIS — M54.16 LUMBAR RADICULOPATHY: ICD-10-CM

## 2021-11-01 PROCEDURE — 99204 OFFICE O/P NEW MOD 45 MIN: CPT | Performed by: STUDENT IN AN ORGANIZED HEALTH CARE EDUCATION/TRAINING PROGRAM

## 2021-11-01 NOTE — PROGRESS NOTES
Patient: Zuleyma Keating  : 1960    Primary Care Provider: Provider, No Known    Requesting Provider: As above      Chief Complaint: Leg Pain (Right)      History of Present Illness: This is a 60 y.o. female who is status post an L4-5 fusion in  by Dr. Acosta.  After her surgery she did very well.  Over the past month and a half, she has developed right lower extremity weakness and pain.  She feels like the pain is coming from her hip but then radiates down the lateral aspect of her right lower extremity.  The weakness is becoming challenging for her to live her daily life.  She is unable to play with her grandchildren or ambulate long distances.  She denies any back pain or left lower extremity pain.  She has tried NSAIDs without any significant improvement in her pain.  She underwent a lumbar MRI and was referred to me for further evaluation.    PMHX  Allergies:  No Known Allergies  Medications    Current Outpatient Medications:   •  buPROPion SR (WELLBUTRIN SR) 150 MG 12 hr tablet, TAKE ONE TABLET BY MOUTH TWICE A DAY, Disp: 60 tablet, Rfl: 2  •  HYDROcodone-acetaminophen (NORCO) 5-325 MG per tablet, Take 1 tablet by mouth 4 (Four) Times a Day As Needed. for pain, Disp: , Rfl:   •  meloxicam (MOBIC) 7.5 MG tablet, TAKE 1 TABLET BY MOUTH EVERY DAY as needed FOR joint AND muscle pains, Disp: , Rfl:   •  albuterol sulfate  (90 Base) MCG/ACT inhaler, Inhale 2 puffs Every 4 (Four) Hours As Needed for Wheezing., Disp: 8 g, Rfl: 0  •  pantoprazole (PROTONIX) 40 MG EC tablet, Take 40 mg by mouth Daily., Disp: , Rfl:   •  valACYclovir (VALTREX) 1000 MG tablet, Take 2 tablets every 12 hours for one day, Disp: 4 tablet, Rfl: 5  Past Medical History:  Past Medical History:   Diagnosis Date   • Anxiety    • H/O cold sores    • Pessary maintenance     Ring #5     Past Surgical History:  Past Surgical History:   Procedure Laterality Date   • BREAST BIOPSY Left 2018    benign   • BREAST BIOPSY  "Right    • LUMBAR DISC SURGERY  02/2013    L3-L4   • LUMBAR DISCECTOMY FUSION INSTRUMENTATION  12/2013    L3 - L4 with hardware   • MAMMO STEREOTACTIC BREAST BIOPSY 1ST W WO DEVICE Left 03/2018   • MAMMO STEREOTACTIC BREAST BX ADD W WO DEVICE Right 06/05/2012   • MAMMO STEREOTACTIC BREAST BX ADD W WO DEVICE Left 2002     Social Hx:  Social History     Tobacco Use   • Smoking status: Never Smoker   • Smokeless tobacco: Never Used   Vaping Use   • Vaping Use: Never used   Substance Use Topics   • Alcohol use: Yes     Comment: occasionally   • Drug use: No     Family Hx:  Family History   Problem Relation Age of Onset   • Breast cancer Mother 48        maybe late 70s   • Esophageal cancer Brother    • Endometrial cancer Neg Hx    • Ovarian cancer Neg Hx    • Colon cancer Neg Hx    • Uterine cancer Neg Hx    • Osteoporosis Neg Hx      Review of Systems:        Review of Systems     Physical Exam:   /78   Pulse 84   Resp 20   Ht 165.1 cm (65\")   Wt 82.1 kg (181 lb)   LMP  (LMP Unknown)   SpO2 98%   BMI 30.12 kg/m²   Awake, alert and oriented x 3  Speech f/c  Opens eyes spont  Pupils 3 mm rx bilaterally  EOMI  Normal sensation to light touch in all 3 distributions of CN V bilaterally  FS  TML  5/5 in her left lower extremity.  In her right lower extremity she is 5 out of 5 in hip flexion, 4+ out of 5 in knee extension, dorsiflexion, EHL, and plantar flexion  Normal sensation to light touch in all 4 ext  Right hip is very tender to palpation in reproduces the right lower extremity pain the patient describes.      Diagnostic Studies:  All neurological imaging studies were independently reviewed unless stated otherwise    Assessment/Plan:  This is a 60 y.o. female status post an L3-4 fusion by Dr. Acosta in 2014.  The patient comes in with a month and a half of right lower extremity pain and weakness.  In reviewing the patient's lumbar MRI, there is good decompression at the L4-5 level.  The level above at L3-4 " and does not show any significant stenosis or nerve root compression.  The patient's pain was exacerbated by right hip palpation.  I am concerned that this is the source of her pain and not her lumbar spine.  I am going to refer her to orthopedics for evaluation of her right hip.  To completely evaluate her lumbar spine, I am going to order a lumbar myelogram as well as a right lower extremity EMG.  We will have the patient return to clinic after these tests and evaluations are done to discuss the results.     - - -

## 2021-11-02 PROBLEM — M25.551 RIGHT HIP PAIN: Status: ACTIVE | Noted: 2021-11-02

## 2021-11-02 PROBLEM — M54.16 LUMBAR RADICULOPATHY: Status: ACTIVE | Noted: 2021-11-02

## 2021-11-03 DIAGNOSIS — M54.16 LUMBAR RADICULOPATHY: ICD-10-CM

## 2021-11-03 DIAGNOSIS — M25.551 RIGHT HIP PAIN: Primary | ICD-10-CM

## 2021-11-30 DIAGNOSIS — M54.16 LUMBAR RADICULOPATHY: Primary | ICD-10-CM

## 2021-11-30 DIAGNOSIS — M25.551 RIGHT HIP PAIN: ICD-10-CM

## 2021-12-03 ENCOUNTER — APPOINTMENT (OUTPATIENT)
Dept: NEUROLOGY | Facility: HOSPITAL | Age: 61
End: 2021-12-03

## 2021-12-08 ENCOUNTER — TELEPHONE (OUTPATIENT)
Dept: NEUROSURGERY | Facility: CLINIC | Age: 61
End: 2021-12-08

## 2021-12-08 NOTE — TELEPHONE ENCOUNTER
PT CALLED WITH QUESTIONS REGARDING HER PROCEDURE TOMORROW FOR HER MYELOGRAM-WARM TRANSFERRED TO DEB DUE TO PROCEDURE QUESTION-HE WAS TRANSFERRING TO [facility]

## 2021-12-09 ENCOUNTER — HOSPITAL ENCOUNTER (OUTPATIENT)
Facility: HOSPITAL | Age: 61
Discharge: HOME | End: 2021-12-09
Attending: RADIOLOGY | Admitting: RADIOLOGY

## 2021-12-09 ENCOUNTER — APPOINTMENT (OUTPATIENT)
Dept: CT IMAGING | Facility: HOSPITAL | Age: 61
End: 2021-12-09

## 2021-12-09 ENCOUNTER — HOSPITAL ENCOUNTER (OUTPATIENT)
Facility: HOSPITAL | Age: 61
Setting detail: HOSPITAL OUTPATIENT SURGERY
Discharge: HOME | End: 2021-12-09
Attending: RADIOLOGY | Admitting: RADIOLOGY

## 2021-12-09 VITALS
DIASTOLIC BLOOD PRESSURE: 82 MMHG | WEIGHT: 182.76 LBS | OXYGEN SATURATION: 95 % | BODY MASS INDEX: 30.45 KG/M2 | SYSTOLIC BLOOD PRESSURE: 129 MMHG | TEMPERATURE: 97.8 F | HEART RATE: 83 BPM | HEIGHT: 65 IN | RESPIRATION RATE: 16 BRPM

## 2021-12-09 DIAGNOSIS — M25.551 RIGHT HIP PAIN: ICD-10-CM

## 2021-12-09 DIAGNOSIS — M54.16 LUMBAR RADICULOPATHY: ICD-10-CM

## 2021-12-09 PROCEDURE — 62304 MYELOGRAPHY LUMBAR INJECTION: CPT | Performed by: RADIOLOGY

## 2021-12-09 PROCEDURE — 0 IOPAMIDOL 41 % SOLUTION: Performed by: RADIOLOGY

## 2021-12-09 PROCEDURE — 72132 CT LUMBAR SPINE W/DYE: CPT

## 2021-12-09 PROCEDURE — S0260 H&P FOR SURGERY: HCPCS | Performed by: PHYSICIAN ASSISTANT

## 2021-12-09 RX ORDER — LIDOCAINE HYDROCHLORIDE 10 MG/ML
INJECTION, SOLUTION EPIDURAL; INFILTRATION; INTRACAUDAL; PERINEURAL AS NEEDED
Status: DISCONTINUED | OUTPATIENT
Start: 2021-12-09 | End: 2021-12-09 | Stop reason: HOSPADM

## 2021-12-09 NOTE — H&P
NAME: CHRIS MALDONADO   DOS: 2021  : 1960  PCP: Provider, No Known    Chief Complaint:  Low Back Pain, Right leg pain     History of Present Illness: Ms. Maldonado is a 60 y.o. female who is seen today with a chief complaint of low back and right leg pain.  She has had a prior L4-5 fusion in  by Dr. Acosta.  Following that procedure, patient did very well.  However, over the last few months she has developed right lower extremity weakness and pain.  States the pain radiates down the lateral aspect of her right extremity.  She notes the symptoms are affecting her activities of daily living.  She underwent an MRI and is now seen today for a lumbar myelogram for further evaluation.      Past Medical History:   Diagnosis Date   • Anxiety    • H/O cold sores    • Pessary maintenance     Ring #5       Past Surgical History:   Procedure Laterality Date   • BREAST BIOPSY Left 2018    benign   • BREAST BIOPSY Right    • LUMBAR DISC SURGERY  2013    L3-L4   • LUMBAR DISCECTOMY FUSION INSTRUMENTATION  2013    L3 - L4 with hardware   • MAMMO STEREOTACTIC BREAST BIOPSY 1ST W WO DEVICE Left 2018   • MAMMO STEREOTACTIC BREAST BX ADD W WO DEVICE Right 2012   • MAMMO STEREOTACTIC BREAST BX ADD W WO DEVICE Left              Review of Systems   Musculoskeletal: Positive for back pain.            Medications:  No current facility-administered medications for this encounter.    Allergies:  No Known Allergies    Social History     Tobacco Use   • Smoking status: Never Smoker   • Smokeless tobacco: Never Used   Vaping Use   • Vaping Use: Never used   Substance Use Topics   • Alcohol use: Yes     Comment: occasionally   • Drug use: No       Family History   Problem Relation Age of Onset   • Breast cancer Mother 48        maybe late 70s   • Esophageal cancer Brother    • Endometrial cancer Neg Hx    • Ovarian cancer Neg Hx    • Colon cancer Neg Hx    • Uterine cancer Neg Hx    • Osteoporosis Neg  Hx        Review of Imaging:  No new imaging to review    Vitals:    12/09/21 1005   BP: 145/81   Pulse:    Resp:    Temp:    SpO2:      Body mass index is 30.41 kg/m².    Physical Exam  Constitutional:       Appearance: Normal appearance.   HENT:      Head: Normocephalic and atraumatic.   Neurological:      Mental Status: She is alert and oriented to person, place, and time. Mental status is at baseline.       Neurologic Exam     Mental Status   Oriented to person, place, and time.       Diagnoses/Plan:    Ms. Keating is a 60 y.o. female underwent an L4-5 fusion by Dr. Acosta in 2014.  Over the last few months, she has had exacerbation of right lower extremity pain and weakness.  Due to these ongoing symptoms, with the MRI being nonconclusive, she presents today for a lumbar myelogram for further evaluation.  She will also obtain a right lower extremity EMG for completeness.  Risk and benefits of the procedure were discussed at length.  Postprocedural instructions provided.  Patient notes understanding and willing to proceed.          Kathi Steven PA-C

## 2021-12-14 ENCOUNTER — TELEPHONE (OUTPATIENT)
Dept: NEUROSURGERY | Facility: CLINIC | Age: 61
End: 2021-12-14

## 2021-12-14 NOTE — TELEPHONE ENCOUNTER
The myelogram was relatively benign not showing any source for her pain.  Dr. Arevalo mention in his note that he thought this could be her hip.  Please be sure that she has an orthopedic appointment upcoming

## 2021-12-14 NOTE — TELEPHONE ENCOUNTER
Caller: Zuleyma Keating    Relationship: Self    Best call back number: [phone]    What is the best time to reach you: ANYTIME    Who are you requesting to speak with (clinical staff, provider,  specific staff member): PROVIDER    What was the call regarding: MYELOGRAM RESULTS    Do you require a callback: YES    PATIENT WOULD LIKE A CALL BACK FROM A PROVIDER TO EXPLAIN HER MYELOGRAM RESULTS. SHE WOULD ALSO LIKE HER F/U BEFORE [facility] IF ANYTHING OPENS UP (PATIENT UNDERSTANDS SHE IS CURRENTLY SCHEDULED FOR 01/03/22). PLEASE CALL PATIENT. THANK YOU.

## 2021-12-14 NOTE — TELEPHONE ENCOUNTER
Provider:  Mickey  Caller: patient  Time of call:   12:38  Phone #:  [phone]  Surgery:  na  Surgery Date:  na  Last visit:   11-1-21  Next visit: 1-3-22    DARRYL:         Reason for call:   Patient called and wanted some one to go over her test results with her.She has also called the [facility] and left the same message. Please Advise. Thank you.

## 2021-12-14 NOTE — TELEPHONE ENCOUNTER
Patient notified and states that she has specific questions about the myelogram report and her CT report.    She is going to gather this information and call back with her questions.  TY

## 2021-12-14 NOTE — TELEPHONE ENCOUNTER
Patient called back and I told her that she might have to wait till she comes in or someone would call her and go over the results. I told her that if we was told what to do someone would call her and let her know. She said ok she is just curious about the results because she is hurting. She is on the call list in case she can be seen sooner.Thank you.

## 2022-01-03 ENCOUNTER — OFFICE VISIT (OUTPATIENT)
Dept: NEUROSURGERY | Facility: CLINIC | Age: 62
End: 2022-01-03

## 2022-01-03 VITALS
HEIGHT: 65 IN | WEIGHT: 182.4 LBS | SYSTOLIC BLOOD PRESSURE: 126 MMHG | TEMPERATURE: 97.7 F | BODY MASS INDEX: 30.39 KG/M2 | DIASTOLIC BLOOD PRESSURE: 82 MMHG

## 2022-01-03 DIAGNOSIS — M25.551 RIGHT HIP PAIN: Primary | ICD-10-CM

## 2022-01-03 DIAGNOSIS — M54.16 LUMBAR RADICULOPATHY: ICD-10-CM

## 2022-01-03 PROCEDURE — 99214 OFFICE O/P EST MOD 30 MIN: CPT | Performed by: STUDENT IN AN ORGANIZED HEALTH CARE EDUCATION/TRAINING PROGRAM

## 2022-01-03 NOTE — PROGRESS NOTES
"NEUROSURGERY PROGRESS NOTE    Chief Complaint: Right leg pain    Subjective: This is a 61-year-old female who I evaluated previously for right lower extremity pain.  The patient describes an achy sharp pain that starts on the lateral aspect of her right hip and then she feels it radiate down her lower extremity to the knee.  She has also noted subjective weakness in her right lower extremity.  I sent the patient for a lumbar myelogram as well as a nerve conduction study.  She was unable to perform the EMG but did get the myelogram finished.  She comes in today for further evaluation.    Objective    Vital Signs: Blood pressure 126/82, temperature 97.7 °F (36.5 °C), temperature source Infrared, height 165.1 cm (65\"), weight 82.7 kg (182 lb 6.4 oz), not currently breastfeeding.    Physical Exam  Awake, alert and oriented x 3  Opens eyes spont  Pupils 3 mm rx bilat  EOMI  FS  TML  5/5 in her left lower extremity.  In her right lower extremity she is 5 out of 5 in hip flexion, 4+ out of 5 in knee extension, dorsiflexion, EHL, and plantar flexion  Palpation of the proximal lateral right lower extremity, just below the hip reproduces the patient's right lower extremity pain.    Current Medications:   Current Outpatient Medications:   •  buPROPion SR (WELLBUTRIN SR) 150 MG 12 hr tablet, TAKE ONE TABLET BY MOUTH TWICE A DAY (Patient taking differently: Take 150 mg by mouth 2 (Two) Times a Day.), Disp: 60 tablet, Rfl: 2  •  HYDROcodone-acetaminophen (NORCO) 5-325 MG per tablet, Take 1 tablet by mouth 4 (Four) Times a Day As Needed. for pain, Disp: , Rfl:   •  meloxicam (MOBIC) 7.5 MG tablet, Take 3.75 mg by mouth 2 (Two) Times a Day As Needed., Disp: , Rfl:   •  pantoprazole (PROTONIX) 40 MG EC tablet, Take 40 mg by mouth Daily As Needed., Disp: , Rfl:   •  valACYclovir (VALTREX) 1000 MG tablet, Take 2 tablets every 12 hours for one day (Patient taking differently: 2 (Two) Times a Day As Needed. Take 2 tablets every 12 hours " for one day), Disp: 4 tablet, Rfl: 5     Laboratory Results:                              Brief Urine Lab Results     None        Microbiology Results (last 10 days)     ** No results found for the last 240 hours. **          Diagnostic Imaging: I reviewed and independently interpreted the new imaging.     Assessment/Plan:  This is a 61-year-old female presenting with right lower extremity pain.  In review the patient's lumbar MRI as well as her CT myelogram, she has no significant nerve root compression or stenosis at any level.  She has no abnormal motion on flexion and extension.  The patient's main complaint is right lower extremity pain which is exacerbated by palpation of her hip.  I do not think her right lower extremity pain is coming from her lumbar spine but rather her hip.  I will order a right lower extremity EMG for completeness sake and will call the patient with the results.  Assuming this does not show any significant radiculopathy, I would defer to an orthopedist for hip evaluation.      Cee Valdez MA  01/03/22  10:20 EST

## 2022-01-26 ENCOUNTER — HOSPITAL ENCOUNTER (OUTPATIENT)
Dept: MRI IMAGING | Facility: HOSPITAL | Age: 62
Discharge: HOME | End: 2022-01-26
Admitting: OBSTETRICS & GYNECOLOGY

## 2022-01-26 DIAGNOSIS — Z80.3 FAMILY HISTORY OF MALIGNANT NEOPLASM OF BREAST: ICD-10-CM

## 2022-01-26 DIAGNOSIS — Z91.89 AT HIGH RISK FOR BREAST CANCER: ICD-10-CM

## 2022-01-26 LAB — CREAT BLDA-MCNC: 0.7 MG/DL (ref 0.6–1.3)

## 2022-01-26 PROCEDURE — 0 GADOBENATE DIMEGLUMINE 529 MG/ML SOLUTION: Performed by: OBSTETRICS & GYNECOLOGY

## 2022-01-26 PROCEDURE — 77049 MRI BREAST C-+ W/CAD BI: CPT

## 2022-01-26 PROCEDURE — A9577 INJ MULTIHANCE: HCPCS | Performed by: OBSTETRICS & GYNECOLOGY

## 2022-01-26 PROCEDURE — 82565 ASSAY OF CREATININE: CPT

## 2022-01-26 PROCEDURE — 77049 MRI BREAST C-+ W/CAD BI: CPT | Performed by: RADIOLOGY

## 2022-01-26 RX ADMIN — GADOBENATE DIMEGLUMINE 15 ML: 529 INJECTION, SOLUTION INTRAVENOUS at 13:00

## 2022-01-28 ENCOUNTER — TELEPHONE (OUTPATIENT)
Dept: MRI IMAGING | Facility: HOSPITAL | Age: 62
End: 2022-01-28

## 2022-02-23 ENCOUNTER — TELEPHONE (OUTPATIENT)
Dept: OTHER | Facility: HOSPITAL | Age: 62
End: 2022-02-23

## 2022-02-23 NOTE — TELEPHONE ENCOUNTER
Attempted to contact pt to schedule genetics phone consult.  She didn’t answer.  Left voicemail asking her to return my call.

## 2022-03-03 ENCOUNTER — TELEPHONE (OUTPATIENT)
Dept: OTHER | Facility: HOSPITAL | Age: 62
End: 2022-03-03

## 2022-03-03 NOTE — TELEPHONE ENCOUNTER
Attempted to contact pt again to schedule genetics phone consult.  She didn’t answer.  Left voicemail asking her to return my call.

## 2022-03-29 ENCOUNTER — TELEPHONE (OUTPATIENT)
Dept: NEUROSURGERY | Facility: CLINIC | Age: 62
End: 2022-03-29

## 2022-03-29 NOTE — TELEPHONE ENCOUNTER
Provider:  Mickey   Caller: Self   Time of call: 2:57 PM     Phone #:  [phone]  Surgery:    Surgery Date:    Last visit:   Office Visit with Shekhar Arevalo MD (01/03/2022)    Next visit:           Reason for call:  Pt LVM asking about test results. Informed pt that we have not received test results and that our office will request results tomorrow. Pt verbalized understanding and was thankful for the call.

## 2022-03-31 ENCOUNTER — DOCUMENTATION (OUTPATIENT)
Dept: NEUROSURGERY | Facility: CLINIC | Age: 62
End: 2022-03-31

## 2022-03-31 DIAGNOSIS — M25.551 RIGHT HIP PAIN: ICD-10-CM

## 2022-03-31 DIAGNOSIS — M54.16 LUMBAR RADICULOPATHY: Primary | ICD-10-CM

## 2022-03-31 NOTE — PROGRESS NOTES
I spoke to the patient over the phone regarding the results of her EMG.  It shows a right S1 radiculopathy.  These findings are consistent with the patient's clinical symptoms.  I have done a thorough review of her imaging, she has no significant compression of the S1 nerve root, but to better evaluate this, I am going to send her for a selective nerve root block of the right S1 nerve root.  We will have her follow-up with me in [place] after this is done.

## 2022-07-05 ENCOUNTER — APPOINTMENT (OUTPATIENT)
Dept: CT IMAGING | Facility: HOSPITAL | Age: 62
End: 2022-07-05

## 2022-07-05 ENCOUNTER — HOSPITAL ENCOUNTER (EMERGENCY)
Facility: HOSPITAL | Age: 62
Discharge: HOME | End: 2022-07-05
Attending: STUDENT IN AN ORGANIZED HEALTH CARE EDUCATION/TRAINING PROGRAM | Admitting: STUDENT IN AN ORGANIZED HEALTH CARE EDUCATION/TRAINING PROGRAM

## 2022-07-05 ENCOUNTER — APPOINTMENT (OUTPATIENT)
Dept: GENERAL RADIOLOGY | Facility: HOSPITAL | Age: 62
End: 2022-07-05

## 2022-07-05 VITALS
HEIGHT: 64 IN | DIASTOLIC BLOOD PRESSURE: 86 MMHG | WEIGHT: 175 LBS | RESPIRATION RATE: 18 BRPM | OXYGEN SATURATION: 96 % | HEART RATE: 77 BPM | SYSTOLIC BLOOD PRESSURE: 138 MMHG | TEMPERATURE: 99 F | BODY MASS INDEX: 29.88 KG/M2

## 2022-07-05 DIAGNOSIS — M25.551 BILATERAL HIP PAIN: ICD-10-CM
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
## 2022-07-05 DIAGNOSIS — M79.18 MUSCULOSKELETAL PAIN: ICD-10-CM

## 2022-07-05 DIAGNOSIS — M25.552 BILATERAL HIP PAIN: ICD-10-CM

## 2022-07-05 DIAGNOSIS — S16.1XXA STRAIN OF NECK MUSCLE, INITIAL ENCOUNTER: ICD-10-CM

## 2022-07-05 DIAGNOSIS — S86.911A KNEE STRAIN, RIGHT, INITIAL ENCOUNTER: ICD-10-CM

## 2022-07-05 PROCEDURE — 72125 CT NECK SPINE W/O DYE: CPT | Performed by: RADIOLOGY

## 2022-07-05 PROCEDURE — 73562 X-RAY EXAM OF KNEE 3: CPT | Performed by: RADIOLOGY

## 2022-07-05 PROCEDURE — 73523 X-RAY EXAM HIPS BI 5/> VIEWS: CPT | Performed by: RADIOLOGY

## 2022-07-05 PROCEDURE — 72125 CT NECK SPINE W/O DYE: CPT

## 2022-07-05 PROCEDURE — 99282 EMERGENCY DEPT VISIT SF MDM: CPT

## 2022-07-05 PROCEDURE — 72128 CT CHEST SPINE W/O DYE: CPT | Performed by: RADIOLOGY

## 2022-07-05 PROCEDURE — 73562 X-RAY EXAM OF KNEE 3: CPT

## 2022-07-05 PROCEDURE — 72131 CT LUMBAR SPINE W/O DYE: CPT

## 2022-07-05 PROCEDURE — 72128 CT CHEST SPINE W/O DYE: CPT

## 2022-07-05 PROCEDURE — 72131 CT LUMBAR SPINE W/O DYE: CPT | Performed by: RADIOLOGY

## 2022-07-05 PROCEDURE — 71101 X-RAY EXAM UNILAT RIBS/CHEST: CPT | Performed by: RADIOLOGY

## 2022-07-05 PROCEDURE — 71101 X-RAY EXAM UNILAT RIBS/CHEST: CPT

## 2022-07-05 PROCEDURE — 73523 X-RAY EXAM HIPS BI 5/> VIEWS: CPT

## 2022-07-05 RX ORDER — ORPHENADRINE CITRATE 100 MG/1
100 TABLET, EXTENDED RELEASE ORAL 2 TIMES DAILY PRN
Qty: 14 TABLET | Refills: 0 | Status: SHIPPED | OUTPATIENT
Start: 2022-07-05

## 2022-07-05 RX ORDER — IBUPROFEN 800 MG/1
800 TABLET ORAL EVERY 8 HOURS PRN
Qty: 30 TABLET | Refills: 0 | Status: SHIPPED | OUTPATIENT
Start: 2022-07-05

## 2022-07-05 NOTE — ED PROVIDER NOTES
Subjective   61 year old female with past medical hx of anxiety presents to the ED after a fall she sustained a few days ago. Patient states she was at [facility] and turned the corner to go down an aisle, when she slipped on something wet in the floor. She denies hitting her head. She does complain of neck pain, back pain, right knee pain, left rib pain, and bilateral hip pain. She does report previous back surgery in the past and has a cage in her back. She states she didn't present to the ED immediately after fall as states she thought she'd be ok, but states the pain seems to have worsened. Aggravating factors include movement. Denies any alleviating factors, despite rest. Denies any other complaints or concerns at this time.      History provided by:  Patient   used: No        Review of Systems   Constitutional: Negative.  Negative for fever.   HENT: Negative.    Respiratory: Negative.    Cardiovascular: Negative.  Negative for chest pain.   Gastrointestinal: Negative.  Negative for abdominal pain.   Endocrine: Negative.    Genitourinary: Negative.  Negative for dysuria.   Musculoskeletal: Positive for back pain and neck pain.        (+) hip pain, knee pain, rib pain   Skin: Negative.    Neurological: Negative.    Psychiatric/Behavioral: Negative.    All other systems reviewed and are negative.      Past Medical History:   Diagnosis Date   • Anxiety 2015   • H/O cold sores    • Pessary maintenance     Ring #5       No Known Allergies    Past Surgical History:   Procedure Laterality Date   • BREAST BIOPSY Left 03/2018    benign   • BREAST BIOPSY Right    • INTERVENTIONAL RADIOLOGY PROCEDURE N/A 12/9/2021    Procedure: IR myelogram, lumbar;  Surgeon: Malachi De La Cruz MD;  Location: [facility] INVASIVE LOCATION;  Service: Interventional Radiology;  Laterality: N/A;   • LUMBAR DISC SURGERY  02/2013    L3-L4   • LUMBAR DISCECTOMY FUSION INSTRUMENTATION  12/2013    L3 - L4 with hardware   • MAMMO  STEREOTACTIC BREAST BIOPSY 1ST W WO DEVICE Left 03/2018   • MAMMO STEREOTACTIC BREAST BX ADD W WO DEVICE Right 06/05/2012   • MAMMO STEREOTACTIC BREAST BX ADD W WO DEVICE Left 2002       Family History   Problem Relation Age of Onset   • Breast cancer Mother 48        maybe late 70s   • Esophageal cancer Brother    • Endometrial cancer Neg Hx    • Ovarian cancer Neg Hx    • Colon cancer Neg Hx    • Uterine cancer Neg Hx    • Osteoporosis Neg Hx        Social History     Socioeconomic History   • Marital status:    Tobacco Use   • Smoking status: Never Smoker   • Smokeless tobacco: Never Used   Vaping Use   • Vaping Use: Never used   Substance and Sexual Activity   • Alcohol use: Yes     Comment: occasionally   • Drug use: No   • Sexual activity: Yes     Partners: Male     Birth control/protection: Post-menopausal           Objective   Physical Exam  Vitals and nursing note reviewed.   Constitutional:       General: She is not in acute distress.     Appearance: She is well-developed. She is not diaphoretic.   HENT:      Head: Normocephalic and atraumatic.      Right Ear: External ear normal.      Left Ear: External ear normal.      Nose: Nose normal.   Eyes:      Conjunctiva/sclera: Conjunctivae normal.      Pupils: Pupils are equal, round, and reactive to light.   Neck:      Vascular: No JVD.      Trachea: No tracheal deviation.   Cardiovascular:      Rate and Rhythm: Normal rate and regular rhythm.      Heart sounds: Normal heart sounds. No murmur heard.  Pulmonary:      Effort: Pulmonary effort is normal. No respiratory distress.      Breath sounds: Normal breath sounds. No wheezing.   Chest:      Chest wall: Tenderness present.       Abdominal:      General: Bowel sounds are normal.      Palpations: Abdomen is soft.      Tenderness: There is no abdominal tenderness.   Musculoskeletal:         General: No deformity. Normal range of motion.      Cervical back: Normal range of motion and neck supple.  Tenderness present. Pain with movement present.      Thoracic back: Tenderness present.      Lumbar back: Tenderness present.        Back:       Right hip: Tenderness present.      Left hip: Tenderness present.      Right knee: Normal.      Left knee: Normal.   Skin:     General: Skin is warm and dry.      Coloration: Skin is not pale.      Findings: Abrasion present. No erythema or rash.          Neurological:      Mental Status: She is alert and oriented to person, place, and time.      Cranial Nerves: No cranial nerve deficit.   Psychiatric:         Behavior: Behavior normal.         Thought Content: Thought content normal.         Procedures           ED Course  ED Course as of 07/10/22 1031   Tue Jul 05, 2022   1640 XR Ribs Left With PA Chest [TK]   1642 XR Hips Bilateral With or Without Pelvis 5 View [TK]   1642 XR Knee 3 View Right [TK]   1642 CT Cervical Spine Without Contrast [TK]   1642 CT Lumbar Spine Without Contrast [TK]   1643 CT Thoracic Spine Without Contrast [TK]      ED Course User Index  [TK] Orestes Parada, RENUKA                                           MDM  Number of Diagnoses or Management Options  Bilateral hip pain: new and requires workup  Fall, initial encounter: new and requires workup  Knee strain, right, initial encounter: new and requires workup  Musculoskeletal pain: new and requires workup  Strain of neck muscle, initial encounter: new and requires workup     Amount and/or Complexity of Data Reviewed  Tests in the radiology section of CPT®: reviewed and ordered    Risk of Complications, Morbidity, and/or Mortality  Presenting problems: moderate  Diagnostic procedures: moderate  Management options: moderate    Patient Progress  Patient progress: stable      Final diagnoses:   Fall, initial encounter   Strain of neck muscle, initial encounter   Knee strain, right, initial encounter   Bilateral hip pain   Musculoskeletal pain       ED Disposition  ED Disposition     ED Disposition    Discharge    Condition   Stable    Comment   --             Bart Prieto MD  [address]  [place] [id number] [phone]    In 2 days           Medication List      New Prescriptions    ibuprofen 800 MG tablet  Commonly known as: ADVIL,MOTRIN  Take 1 tablet by mouth Every 8 (Eight) Hours As Needed for Moderate Pain .     orphenadrine 100 MG 12 hr tablet  Commonly known as: NORFLEX  Take 1 tablet by mouth 2 (Two) Times a Day As Needed for Muscle Spasms or Mild Pain .        Changed    valACYclovir 1000 MG tablet  Commonly known as: Valtrex  Take 2 tablets every 12 hours for one day  What changed:   · when to take this  · reasons to take this           Where to Get Your Medications      These medications were sent to [email or web address] [place], [place] - [address] [place] [address] [phone] [place] [phone] [address] [address]    Phone: [phone]   · ibuprofen 800 MG tablet  · orphenadrine 100 MG 12 hr tablet          Orestes Parada, PAAlishaC  07/10/22 1031

## 2022-07-31 PROBLEM — R87.615 UNSATISFACTORY CERVICAL CYTOLOGY SMEAR: Status: RESOLVED | Noted: 2021-08-09 | Resolved: 2022-07-31

## 2022-08-01 ENCOUNTER — OFFICE VISIT (OUTPATIENT)
Dept: OBSTETRICS AND GYNECOLOGY | Facility: CLINIC | Age: 62
End: 2022-08-01

## 2022-08-01 VITALS — DIASTOLIC BLOOD PRESSURE: 68 MMHG | SYSTOLIC BLOOD PRESSURE: 132 MMHG | WEIGHT: 184 LBS | BODY MASS INDEX: 31.58 KG/M2

## 2022-08-01 DIAGNOSIS — N81.10 BADEN-WALKER GRADE 3 CYSTOCELE: ICD-10-CM

## 2022-08-01 DIAGNOSIS — Z01.419 WELL WOMAN EXAM WITH ROUTINE GYNECOLOGICAL EXAM: Primary | ICD-10-CM

## 2022-08-01 DIAGNOSIS — N95.1 SYMPTOMATIC MENOPAUSAL OR FEMALE CLIMACTERIC STATES: ICD-10-CM

## 2022-08-01 DIAGNOSIS — Z80.3 FAMILY HISTORY OF BREAST CANCER: ICD-10-CM

## 2022-08-01 DIAGNOSIS — Z91.89 AT HIGH RISK FOR BREAST CANCER: ICD-10-CM

## 2022-08-01 PROCEDURE — 99396 PREV VISIT EST AGE 40-64: CPT | Performed by: OBSTETRICS & GYNECOLOGY

## 2022-08-01 NOTE — PROGRESS NOTES
Subjective   Chief Complaint   Patient presents with   • Gynecologic Exam     Return Annual exam. Wishes to discuss uterine prolapse     Zuleyma Keating is a 61 y.o. year old  menopausal female presenting to be seen for her annual exam.  This past year she has not been on hormone replacement therapy.  There has not been vaginal bleeding in the last 12 months.  Menopausal symptoms are not present.    SEXUAL Hx:  She is currently sexually active.  In the past year there there has been NO new sexual partners.    Condoms are never used.  She would not like to be screened for STD's at today's exam.  Railroad is painful: no  HEALTH Hx:  She exercises regularly: yes.  She wears her seat belt: yes.  She has concerns about domestic violence: no.  OTHER THINGS SHE WANTS TO DISCUSS TODAY:  Nothing else    The following portions of the patient's history were reviewed and updated as appropriate:problem list, current medications, allergies, past family history, past medical history, past social history and past surgical history.    Social History    Tobacco Use      Smoking status: Never Smoker      Smokeless tobacco: Never Used      Review of Systems  Constitutional POS: nothing reported    NEG: anorexia or night sweats   Genitourinary POS: hesitancy    NEG: dysuria or hematuria      Gastointestinal POS: nothing reported    NEG: bloating, change in bowel habits, melena or reflux symptoms   Integument POS: nothing reported    NEG: moles that are changing in size, shape, color or rashes   Breast POS: nothing reported    NEG: persistent breast lump, skin dimpling or nipple discharge        Objective   /68 (BP Location: Right arm, Patient Position: Sitting, Cuff Size: Adult)   Wt 83.5 kg (184 lb)   LMP  (LMP Unknown)   BMI 31.58 kg/m²     General:  well developed; well nourished  no acute distress   Skin:  No suspicious lesions seen   Thyroid: normal to inspection and palpation   Breasts:  Examined in supine  position  Symmetric without masses or skin dimpling  Nipples normal without inversion, lesions or discharge  There are no palpable axillary nodes   Abdomen: soft, non-tender; no masses  no umbilical or inguinal hernias are present  no hepato-splenomegaly   Pelvis: Clinical staff was present for exam  External genitalia:  normal appearance of the external genitalia including Bartholin's and [place]'s glands.  :  urethral meatus normal;  Vaginal:  normal pink mucosa without prolapse or lesions.  Cervix:  normal appearance.  Uterus:  normal size, shape and consistency.  Adnexa:  normal bimanual exam of the adnexa.  Rectal:  digital rectal exam not performed; anus visually normal appearing.  Cystocele GRADE 3  Uterine GRADE 1-2        Assessment   1. Normal GYN exam in menopause  2. She is up to date on all relevant gynecologic and colorectal screenings except mammography   3. Cystocele grade 3, uterine or apical prolapse grade ~ 2  4. Family history of breast cancer     Plan   Pap was not done today.  I explained to Zuleyma that the recommendations for Pap smear interval in a low risk patient has lengthened to 3 years time.  I told Zuleyma she still needs to be seen in our office yearly for a full physical including breast and pelvic exam.  She was encouraged to get yearly mammograms along with yearly breast MRI.  The studies should set up to stagger every 6 months.  She should report any palpable breast lump(s) or skin changes regardless of mammographic findings.  I explained to Zuleyma that notification regarding her mammogram results will come from the center performing the study.  Our office will not be routinely calling with mammogram results.  It is her responsibility to make sure that the results from the mammogram are communicated to her by the breast center.  If she has any questions about the results, she is welcome to call our office anytime.  Colonoscopy UTD, DEXA ordered today   We will have staff check on  previous genetics referral.  Reviewed the importance of exercise 2-3 times per week with at least 20-30 minutes of cardio  Reviewed again options from a standpoint of pelvic organ prolapse as she is symptomatic but is not truly decisive at this time about surgical intervention.  Reviewed potential option of transvaginal hysterectomy with anterior repair with the assistance of Dr. Estes.  Patient verbalized understanding.  She has tried a pessary in the past but it was uncomfortable for her  The importance of keeping all planned follow-up and taking all medications as prescribed was emphasized.  Follow up for annual exam in ~ one year    No orders of the defined types were placed in this encounter.        Orders Placed This Encounter   Procedures   • Mammo Screening Digital Tomosynthesis Bilateral With CAD     Standing Status:   Future     Standing Expiration Date:   8/1/2023     Order Specific Question:   Reason for Exam:     Answer:   due for screening mammogram   • MRI Breast Bilateral Screening With & Without Contrast     Standing Status:   Future     Standing Expiration Date:   1/28/2023     Order Specific Question:   Release to patient     Answer:   Immediate   • DEXA Bone Density Axial     Standing Status:   Future     Standing Expiration Date:   8/1/2023     Order Specific Question:   Reason for Exam:     Answer:   Post-menopausal         This note was electronically signed.    Lucía Walker MD  August 1, 2022

## 2022-08-03 ENCOUNTER — HOSPITAL ENCOUNTER (OUTPATIENT)
Dept: GENERAL RADIOLOGY | Facility: HOSPITAL | Age: 62
Discharge: HOME | End: 2022-08-03
Admitting: NURSE PRACTITIONER

## 2022-08-03 ENCOUNTER — TRANSCRIBE ORDERS (OUTPATIENT)
Dept: ADMINISTRATIVE | Facility: HOSPITAL | Age: 62
End: 2022-08-03

## 2022-08-03 DIAGNOSIS — S72.145A CLOSED NONDISPLACED INTERTROCHANTERIC FRACTURE OF LEFT FEMUR, INITIAL ENCOUNTER: Primary | ICD-10-CM

## 2022-08-03 DIAGNOSIS — M25.552 LEFT HIP PAIN: Primary | ICD-10-CM

## 2022-08-03 DIAGNOSIS — S83.241A ACUTE MEDIAL MENISCUS TEAR OF RIGHT KNEE, INITIAL ENCOUNTER: ICD-10-CM

## 2022-08-03 DIAGNOSIS — M25.552 LEFT HIP PAIN: ICD-10-CM

## 2022-08-03 PROCEDURE — 73502 X-RAY EXAM HIP UNI 2-3 VIEWS: CPT | Performed by: RADIOLOGY

## 2022-08-03 PROCEDURE — 73502 X-RAY EXAM HIP UNI 2-3 VIEWS: CPT

## 2022-08-08 ENCOUNTER — TRANSCRIBE ORDERS (OUTPATIENT)
Dept: LAB | Facility: HOSPITAL | Age: 62
End: 2022-08-08

## 2022-08-08 ENCOUNTER — LAB (OUTPATIENT)
Dept: LAB | Facility: HOSPITAL | Age: 62
End: 2022-08-08

## 2022-08-08 DIAGNOSIS — Z01.818 OTHER SPECIFIED PRE-OPERATIVE EXAMINATION: ICD-10-CM

## 2022-08-08 DIAGNOSIS — Z01.818 OTHER SPECIFIED PRE-OPERATIVE EXAMINATION: Primary | ICD-10-CM

## 2022-08-08 LAB — SARS-COV-2 RNA PNL SPEC NAA+PROBE: NOT DETECTED

## 2022-08-08 PROCEDURE — C9803 HOPD COVID-19 SPEC COLLECT: HCPCS

## 2022-08-08 PROCEDURE — U0004 COV-19 TEST NON-CDC HGH THRU: HCPCS

## 2022-08-26 ENCOUNTER — APPOINTMENT (OUTPATIENT)
Dept: BONE DENSITY | Facility: HOSPITAL | Age: 62
End: 2022-08-26

## 2022-08-26 ENCOUNTER — APPOINTMENT (OUTPATIENT)
Dept: MAMMOGRAPHY | Facility: HOSPITAL | Age: 62
End: 2022-08-26

## 2022-08-29 ENCOUNTER — HOSPITAL ENCOUNTER (OUTPATIENT)
Dept: MRI IMAGING | Facility: HOSPITAL | Age: 62
Discharge: HOME | End: 2022-08-29

## 2022-08-29 ENCOUNTER — APPOINTMENT (OUTPATIENT)
Dept: BONE DENSITY | Facility: HOSPITAL | Age: 62
End: 2022-08-29

## 2022-08-29 ENCOUNTER — HOSPITAL ENCOUNTER (OUTPATIENT)
Dept: CT IMAGING | Facility: HOSPITAL | Age: 62
Discharge: HOME | End: 2022-08-29

## 2022-08-29 DIAGNOSIS — S72.145A CLOSED NONDISPLACED INTERTROCHANTERIC FRACTURE OF LEFT FEMUR, INITIAL ENCOUNTER: ICD-10-CM

## 2022-08-29 DIAGNOSIS — S83.241A ACUTE MEDIAL MENISCUS TEAR OF RIGHT KNEE, INITIAL ENCOUNTER: ICD-10-CM

## 2022-08-29 PROCEDURE — 73700 CT LOWER EXTREMITY W/O DYE: CPT | Performed by: RADIOLOGY

## 2022-08-29 PROCEDURE — 73700 CT LOWER EXTREMITY W/O DYE: CPT

## 2022-09-07 ENCOUNTER — HOSPITAL ENCOUNTER (OUTPATIENT)
Dept: MAMMOGRAPHY | Facility: HOSPITAL | Age: 62
Discharge: HOME | End: 2022-09-07
Admitting: OBSTETRICS & GYNECOLOGY

## 2022-09-07 DIAGNOSIS — Z80.3 FAMILY HISTORY OF BREAST CANCER: ICD-10-CM

## 2022-09-07 PROCEDURE — 77063 BREAST TOMOSYNTHESIS BI: CPT

## 2022-09-07 PROCEDURE — 77063 BREAST TOMOSYNTHESIS BI: CPT | Performed by: RADIOLOGY

## 2022-09-07 PROCEDURE — 77067 SCR MAMMO BI INCL CAD: CPT

## 2022-09-07 PROCEDURE — 77067 SCR MAMMO BI INCL CAD: CPT | Performed by: RADIOLOGY

## 2022-09-19 ENCOUNTER — APPOINTMENT (OUTPATIENT)
Dept: BONE DENSITY | Facility: HOSPITAL | Age: 62
End: 2022-09-19

## 2022-09-21 ENCOUNTER — APPOINTMENT (OUTPATIENT)
Dept: MRI IMAGING | Facility: HOSPITAL | Age: 62
End: 2022-09-21

## 2022-09-22 ENCOUNTER — APPOINTMENT (OUTPATIENT)
Dept: BONE DENSITY | Facility: HOSPITAL | Age: 62
End: 2022-09-22

## 2022-10-05 ENCOUNTER — APPOINTMENT (OUTPATIENT)
Dept: MRI IMAGING | Facility: HOSPITAL | Age: 62
End: 2022-10-05

## 2022-10-12 ENCOUNTER — HOSPITAL ENCOUNTER (OUTPATIENT)
Dept: MRI IMAGING | Facility: HOSPITAL | Age: 62
Discharge: HOME | End: 2022-10-12
Admitting: NURSE PRACTITIONER

## 2022-10-12 PROCEDURE — 73721 MRI JNT OF LWR EXTRE W/O DYE: CPT | Performed by: RADIOLOGY

## 2022-10-12 PROCEDURE — 73721 MRI JNT OF LWR EXTRE W/O DYE: CPT

## 2022-11-09 ENCOUNTER — TELEPHONE (OUTPATIENT)
Dept: OBSTETRICS AND GYNECOLOGY | Facility: CLINIC | Age: 62
End: 2022-11-09

## 2022-11-09 NOTE — TELEPHONE ENCOUNTER
JASMIN      PT BEEN HAVING SOME LOWER ABDOMINAL PAIN THAT COMES AND GOES. HURT WORSE THIS MORNING. THINKS IT'S HER LEFT OVARIE AGAIN.

## 2022-11-09 NOTE — TELEPHONE ENCOUNTER
Patient states that pain has been present for about two weeks, is not constant but happens every day on and off and sometimes has more sharp pains that others.  Pain is located in left pelvic pain.  She states that the pain overall has worsened this morning and pain has gone a bit more into her back.  Patient denies any urinary symptoms or any bowel issues at this time.  Pain is only slightly helped by tylenol/advil.  Scheduled patient for US and Gyn F/U with ST tomorrow afternoon.  Patient advised if pain gets excruciating of course report to ED.  Patient verified understanding.

## 2022-11-09 NOTE — PROGRESS NOTES
Subjective   Chief Complaint   Patient presents with   • Follow-up     Left side sharp pelvic pains; us today     Zuleyma Keating is a 61 y.o. year old .  No LMP recorded (lmp unknown). Patient is postmenopausal.  She presents to be seen for evaluation of left pelvic pain which started 7 days ago.  She reports intermittent pain on her left side.  She denies any vaginal bleeding.  She is concerned for possible ovary abnormality.  She states that the time of her annual in August she was not having these pains.  States when she has this pain is a stabbing type pain that lasts for a few seconds at a time.    The following portions of the patient's history were reviewed and updated as appropriate:current medications and allergies    Social History    Tobacco Use      Smoking status: Never      Smokeless tobacco: Never         Objective   /82 (BP Location: Right arm, Patient Position: Sitting, Cuff Size: Adult)   Wt 79.7 kg (175 lb 9.6 oz)   LMP  (LMP Unknown)   BMI 30.14 kg/m²     Lab Review   No data reviewed    Imaging   Pelvic ultrasound report   Transvaginal ultrasound in office today shows endometrial thickness of 1.9 mm multiple fibroids present the largest measuring 2.6 x 2.0 x 2.0 cm right ovary appears normal left ovary not visualized no free fluid noted in cul-de-sac     Assessment   1. Left lower quadrant pain     Plan   1. Reviewed ultrasound results with patient.  Left ovary unable to be visualized on exam today.  We will have Dr. Estes review ultrasound imaging.  2. Discussed with patient pain could be gyn or bowel related.   3. The importance of keeping all planned follow-up and taking all medications as prescribed was emphasized.  4. We will call patient with further plan of care after official ultrasound read reviewed  5. Results with patient discussed with patient return to care if pain worsens with delivery any bleeding starting    No orders of the defined types were placed in  this encounter.         This note was electronically signed.    Liz Carmichael, APRN  November 10, 2022

## 2022-11-10 ENCOUNTER — OFFICE VISIT (OUTPATIENT)
Dept: OBSTETRICS AND GYNECOLOGY | Facility: CLINIC | Age: 62
End: 2022-11-10

## 2022-11-10 VITALS — DIASTOLIC BLOOD PRESSURE: 82 MMHG | BODY MASS INDEX: 30.14 KG/M2 | SYSTOLIC BLOOD PRESSURE: 116 MMHG | WEIGHT: 175.6 LBS

## 2022-11-10 DIAGNOSIS — R10.32 LEFT LOWER QUADRANT PAIN: Primary | ICD-10-CM

## 2022-11-10 PROCEDURE — 99213 OFFICE O/P EST LOW 20 MIN: CPT | Performed by: NURSE PRACTITIONER

## 2022-11-10 RX ORDER — TIRZEPATIDE 2.5 MG/.5ML
2.5 INJECTION, SOLUTION SUBCUTANEOUS WEEKLY
COMMUNITY
Start: 2022-10-13

## 2023-03-09 ENCOUNTER — TELEPHONE (OUTPATIENT)
Dept: OBSTETRICS AND GYNECOLOGY | Facility: CLINIC | Age: 63
End: 2023-03-09
Payer: COMMERCIAL

## 2023-03-09 DIAGNOSIS — Z91.89 AT HIGH RISK FOR BREAST CANCER: Primary | ICD-10-CM

## 2023-03-09 NOTE — TELEPHONE ENCOUNTER
Caller: Zuleyma Keating    Relationship: Self    Best call back number: [phone]    What orders are you requesting (i.e. lab or imaging):BREAST MRI AND MAMMO    In what timeframe would the patient need to come in: AS SOON AS POSSIBLE    Where will you receive your lab/imaging services:   MAMMO: [facility] MRI: [facility] LOCATION    Additional notes:  CONFIRMED YEARLY MRI AND MAMMOS    PLEASE GIVE PT A CALL FOR UPDATE

## 2023-03-10 NOTE — TELEPHONE ENCOUNTER
Orders Placed This Encounter   Procedures   • Mammo Screening Digital Tomosynthesis Bilateral With CAD     Standing Status:   Future     Standing Expiration Date:   3/8/2024     Order Specific Question:   Reason for Exam:     Answer:   routine screening   • MRI Breast Bilateral Screening With & Without Contrast     Standing Status:   Future     Standing Expiration Date:   9/5/2023     Order Specific Question:   Reason for Exam:     Answer:   Lifetime risk greater than 20%     Orders are placed.   It looks like her genetics appointment was cancelled.  Did she get that rescheduled?  Thanks, Lucía Walker MD

## 2023-04-03 ENCOUNTER — HOSPITAL ENCOUNTER (OUTPATIENT)
Dept: MRI IMAGING | Facility: HOSPITAL | Age: 63
Discharge: HOME | End: 2023-04-03
Admitting: OBSTETRICS & GYNECOLOGY
Payer: COMMERCIAL

## 2023-04-03 DIAGNOSIS — Z91.89 AT HIGH RISK FOR BREAST CANCER: ICD-10-CM

## 2023-04-03 LAB — CREAT BLDA-MCNC: 0.6 MG/DL (ref 0.6–1.3)

## 2023-04-03 PROCEDURE — 0 GADOBENATE DIMEGLUMINE 529 MG/ML SOLUTION: Performed by: OBSTETRICS & GYNECOLOGY

## 2023-04-03 PROCEDURE — 77049 MRI BREAST C-+ W/CAD BI: CPT

## 2023-04-03 PROCEDURE — A9577 INJ MULTIHANCE: HCPCS | Performed by: OBSTETRICS & GYNECOLOGY

## 2023-04-03 PROCEDURE — 77049 MRI BREAST C-+ W/CAD BI: CPT | Performed by: RADIOLOGY

## 2023-04-03 PROCEDURE — 82565 ASSAY OF CREATININE: CPT

## 2023-04-03 RX ADMIN — GADOBENATE DIMEGLUMINE 14 ML: 529 INJECTION, SOLUTION INTRAVENOUS at 12:22

## 2023-04-06 ENCOUNTER — TELEPHONE (OUTPATIENT)
Dept: MRI IMAGING | Facility: HOSPITAL | Age: 63
End: 2023-04-06
Payer: COMMERCIAL

## 2023-08-22 ENCOUNTER — OFFICE VISIT (OUTPATIENT)
Dept: OBSTETRICS AND GYNECOLOGY | Facility: CLINIC | Age: 63
End: 2023-08-22
Payer: COMMERCIAL

## 2023-08-22 VITALS — BODY MASS INDEX: 25.92 KG/M2 | DIASTOLIC BLOOD PRESSURE: 70 MMHG | SYSTOLIC BLOOD PRESSURE: 100 MMHG | WEIGHT: 151 LBS

## 2023-08-22 DIAGNOSIS — N81.2 INCOMPLETE UTEROVAGINAL PROLAPSE: ICD-10-CM

## 2023-08-22 DIAGNOSIS — Z01.419 WELL WOMAN EXAM WITH ROUTINE GYNECOLOGICAL EXAM: Primary | ICD-10-CM

## 2023-08-22 PROBLEM — F41.1 GENERALIZED ANXIETY DISORDER: Status: ACTIVE | Noted: 2023-08-22

## 2023-08-22 PROCEDURE — 99396 PREV VISIT EST AGE 40-64: CPT | Performed by: OBSTETRICS & GYNECOLOGY

## 2023-08-22 RX ORDER — BUPROPION HYDROCHLORIDE 150 MG/1
150 TABLET ORAL DAILY
COMMUNITY
Start: 2023-08-16

## 2023-08-22 RX ORDER — TIRZEPATIDE 5 MG/.5ML
INJECTION, SOLUTION SUBCUTANEOUS
COMMUNITY
Start: 2023-06-28 | End: 2023-08-22

## 2023-08-22 RX ORDER — TIRZEPATIDE 10 MG/.5ML
5 INJECTION, SOLUTION SUBCUTANEOUS
COMMUNITY
End: 2023-08-22

## 2023-08-22 RX ORDER — CYANOCOBALAMIN 1000 UG/ML
1000 INJECTION, SOLUTION INTRAMUSCULAR; SUBCUTANEOUS
COMMUNITY
Start: 2023-05-02 | End: 2023-08-22

## 2023-08-22 NOTE — PROGRESS NOTES
Subjective   Chief Complaint   Patient presents with    Annual Exam     No c/c     Zuleyma Keating is a 62 y.o. year old  menopausal female presenting to be seen for her annual exam.  This past year she has not been on hormone replacement therapy.  There has not been vaginal bleeding in the last 12 months.  Menopausal symptoms are not present.    SEXUAL Hx:  She is currently sexually active.  In the past year there there has been NO new sexual partners.    Condoms are never used.  She would not like to be screened for STD's at today's exam.  [place] is painful: no  HEALTH Hx:  She exercises regularly: yes.  She wears her seat belt: yes.  She has concerns about domestic violence: no.  OTHER THINGS SHE WANTS TO DISCUSS TODAY:  Nothing else    The following portions of the patient's history were reviewed and updated as appropriate:problem list, current medications, allergies, past family history, past medical history, past social history, and past surgical history.    Social History    Tobacco Use      Smoking status: Never      Smokeless tobacco: Never      Review of Systems  Constitutional POS: nothing reported    NEG: anorexia or night sweats   Genitourinary POS: nothing reported    NEG: dysuria or hematuria      Gastointestinal POS: nothing reported    NEG: bloating, change in bowel habits, melena, or reflux symptoms   Integument POS: nothing reported    NEG: moles that are changing in size, shape, color or rashes   Breast POS: nothing reported    NEG: persistent breast lump, skin dimpling, or nipple discharge        Objective   /70 (BP Location: Left arm, Patient Position: Sitting, Cuff Size: Adult)   Wt 68.5 kg (151 lb)   LMP  (LMP Unknown)   BMI 25.92 kg/mý     General:  well developed; well nourished  no acute distress   Skin:  No suspicious lesions seen   Thyroid: normal to inspection and palpation   Breasts:  Examined in supine position  Symmetric without masses or skin  dimpling  Nipples normal without inversion, lesions or discharge  There are no palpable axillary nodes   Abdomen: soft, non-tender; no masses  no umbilical or inguinal hernias are present  no hepato-splenomegaly   Pelvis: Clinical staff was present for exam  External genitalia:  normal appearance of the external genitalia including Bartholin's and [place]'s glands.  :  urethral meatus normal;  Vaginal:  atrophic mucosal changes are present;  Cervix:  normal appearance.  Uterus:  normal size, shape and consistency.  Adnexa:  normal bimanual exam of the adnexa.  Rectal:  digital rectal exam not performed; anus visually normal appearing.  Cystocele GRADE 3  Uterine GRADE 2        Assessment   Normal GYN exam menopause   She is up to date on all relevant gynecologic and colorectal screenings  Cystocele grade 3 and apical or uterine prolapse grade 2 overall decently stable from last exam -not affecting overall ADLs     Plan   Pap was not done today.  I explained to Zuleyma that the recommendations for Pap smear interval in a low risk patient has lengthened to 3 years time.  I told Zuleyma she still needs to be seen in our office yearly for a full physical including breast and pelvic exam.  She was encouraged to get yearly mammograms.  She should report any palpable breast lump(s) or skin changes regardless of mammographic findings.  I explained to Zuleyma that notification regarding her mammogram results will come from the center performing the study.  Our office will not be routinely calling with mammogram results.  It is her responsibility to make sure that the results from the mammogram are communicated to her by the breast center.  If she has any questions about the results, she is welcome to call our office anytime.  Colonoscopy up-to-date.  Reviewed starting bone density scan at 66  Reviewed the importance of exercise 2-3 times per week with at least 20-30 minutes of cardio  Reviewed Kegel exercises and option of  pelvic floor physical therapy in the future for her pelvic organ prolapse.  She declines at this time.  The importance of keeping all planned follow-up and taking all medications as prescribed was emphasized.  Follow up for annual exam in ~ one year    No orders of the defined types were placed in this encounter.         This note was electronically signed.    Lucía Walker MD  August 22, 2023

## 2023-09-18 ENCOUNTER — HOSPITAL ENCOUNTER (OUTPATIENT)
Dept: MAMMOGRAPHY | Facility: HOSPITAL | Age: 63
Discharge: HOME | End: 2023-09-18
Admitting: OBSTETRICS & GYNECOLOGY
Payer: COMMERCIAL

## 2023-09-18 DIAGNOSIS — Z91.89 AT HIGH RISK FOR BREAST CANCER: ICD-10-CM

## 2023-09-18 PROCEDURE — 77067 SCR MAMMO BI INCL CAD: CPT

## 2023-09-18 PROCEDURE — 77063 BREAST TOMOSYNTHESIS BI: CPT

## 2023-09-26 DIAGNOSIS — Z91.89 AT HIGH RISK FOR BREAST CANCER: Primary | ICD-10-CM

## 2024-02-05 ENCOUNTER — TRANSCRIBE ORDERS (OUTPATIENT)
Dept: ADMINISTRATIVE | Facility: HOSPITAL | Age: 64
End: 2024-02-05
Payer: COMMERCIAL

## 2024-02-05 DIAGNOSIS — M25.561 RIGHT KNEE PAIN, UNSPECIFIED CHRONICITY: Primary | ICD-10-CM

## 2024-02-14 ENCOUNTER — HOSPITAL ENCOUNTER (OUTPATIENT)
Dept: MRI IMAGING | Facility: HOSPITAL | Age: 64
Discharge: HOME | End: 2024-02-14
Admitting: PHYSICIAN ASSISTANT
Payer: COMMERCIAL

## 2024-02-14 DIAGNOSIS — M25.561 RIGHT KNEE PAIN, UNSPECIFIED CHRONICITY: ICD-10-CM

## 2024-02-14 PROCEDURE — 73721 MRI JNT OF LWR EXTRE W/O DYE: CPT

## 2024-02-14 PROCEDURE — 73721 MRI JNT OF LWR EXTRE W/O DYE: CPT | Performed by: RADIOLOGY

## 2024-04-04 ENCOUNTER — HOSPITAL ENCOUNTER (OUTPATIENT)
Dept: MRI IMAGING | Facility: HOSPITAL | Age: 64
Discharge: HOME | End: 2024-04-04
Admitting: OBSTETRICS & GYNECOLOGY
Payer: COMMERCIAL

## 2024-04-04 DIAGNOSIS — Z91.89 AT HIGH RISK FOR BREAST CANCER: ICD-10-CM

## 2024-04-04 LAB — CREAT BLDA-MCNC: 0.7 MG/DL (ref 0.6–1.3)

## 2024-04-04 PROCEDURE — A9577 INJ MULTIHANCE: HCPCS | Performed by: OBSTETRICS & GYNECOLOGY

## 2024-04-04 PROCEDURE — 0 GADOBENATE DIMEGLUMINE 529 MG/ML SOLUTION: Performed by: OBSTETRICS & GYNECOLOGY

## 2024-04-04 PROCEDURE — 82565 ASSAY OF CREATININE: CPT

## 2024-04-04 PROCEDURE — 77049 MRI BREAST C-+ W/CAD BI: CPT

## 2024-04-04 RX ADMIN — GADOBENATE DIMEGLUMINE 13 ML: 529 INJECTION, SOLUTION INTRAVENOUS at 11:48

## 2024-04-22 ENCOUNTER — OFFICE VISIT (OUTPATIENT)
Dept: CARDIOLOGY | Facility: CLINIC | Age: 64
End: 2024-04-22
Payer: COMMERCIAL

## 2024-04-22 ENCOUNTER — LAB (OUTPATIENT)
Dept: LAB | Facility: HOSPITAL | Age: 64
End: 2024-04-22
Payer: COMMERCIAL

## 2024-04-22 VITALS
OXYGEN SATURATION: 95 % | HEIGHT: 64 IN | DIASTOLIC BLOOD PRESSURE: 69 MMHG | WEIGHT: 153.2 LBS | RESPIRATION RATE: 16 BRPM | BODY MASS INDEX: 26.15 KG/M2 | SYSTOLIC BLOOD PRESSURE: 119 MMHG | HEART RATE: 81 BPM

## 2024-04-22 DIAGNOSIS — R00.2 PALPITATIONS: Primary | ICD-10-CM

## 2024-04-22 DIAGNOSIS — R00.2 PALPITATIONS: ICD-10-CM

## 2024-04-22 LAB
ANION GAP SERPL CALCULATED.3IONS-SCNC: 9.8 MMOL/L (ref 5–15)
BUN SERPL-MCNC: 9 MG/DL (ref 8–23)
BUN/CREAT SERPL: 13.6 (ref 7–25)
CALCIUM SPEC-SCNC: 9.3 MG/DL (ref 8.6–10.5)
CHLORIDE SERPL-SCNC: 103 MMOL/L (ref 98–107)
CO2 SERPL-SCNC: 27.2 MMOL/L (ref 22–29)
CREAT SERPL-MCNC: 0.66 MG/DL (ref 0.57–1)
EGFRCR SERPLBLD CKD-EPI 2021: 99.3 ML/MIN/1.73
GLUCOSE SERPL-MCNC: 89 MG/DL (ref 65–99)
MAGNESIUM SERPL-MCNC: 2.1 MG/DL (ref 1.6–2.4)
POTASSIUM SERPL-SCNC: 4 MMOL/L (ref 3.5–5.2)
SODIUM SERPL-SCNC: 140 MMOL/L (ref 136–145)
T4 FREE SERPL-MCNC: 1.04 NG/DL (ref 0.93–1.7)
TSH SERPL DL<=0.05 MIU/L-ACNC: 3.78 UIU/ML (ref 0.27–4.2)

## 2024-04-22 PROCEDURE — 84439 ASSAY OF FREE THYROXINE: CPT

## 2024-04-22 PROCEDURE — 36415 COLL VENOUS BLD VENIPUNCTURE: CPT

## 2024-04-22 PROCEDURE — 93000 ELECTROCARDIOGRAM COMPLETE: CPT | Performed by: INTERNAL MEDICINE

## 2024-04-22 PROCEDURE — 80048 BASIC METABOLIC PNL TOTAL CA: CPT

## 2024-04-22 PROCEDURE — 84443 ASSAY THYROID STIM HORMONE: CPT

## 2024-04-22 PROCEDURE — 83735 ASSAY OF MAGNESIUM: CPT

## 2024-04-22 PROCEDURE — 99204 OFFICE O/P NEW MOD 45 MIN: CPT | Performed by: INTERNAL MEDICINE

## 2024-04-22 RX ORDER — MELOXICAM 7.5 MG/1
7.5 TABLET ORAL DAILY
COMMUNITY

## 2024-04-22 NOTE — LETTER
April 22, 2024       No Recipients    Patient: Zuleyma Keating   YOB: 1960   Date of Visit: 4/22/2024     Dear DRE Phillips:       Thank you for referring Zuleyma Keating to me for evaluation. Below are the relevant portions of my assessment and plan of care.    If you have questions, please do not hesitate to call me. I look forward to following Zuleyma along with you.         Sincerely,        Ever Kurtz MD        CC:   No Recipients    Ever Kurtz MD  04/22/24 2117  Sign when Signing Visit  Lasha Espinosa PA  Zuleyma Keating  1960 04/22/2024    Patient Active Problem List   Diagnosis   • Incomplete uterovaginal prolapse   • Annual GYN exam w/o problem   • Anxiety   • At high risk for breast cancer   • Right hip pain   • Lumbar radiculopathy   • Generalized anxiety disorder       Dear Sukhdev:    Subjective    Zuleyma Keating is a 63 y.o. female with the problems as listed above, presents    Chief complaint: Recurrent palpitations    History of Present Illness: Ms. Zuleyma Keating is a pleasant 63-year-old  female with no history of known heart disease or cardiac arrhythmias, presents with complaints of having palpitation with rapid heartbeat about a month ago that lasted for few minutes.  She had some recording on the Apple Watch that read reportedly as atrial fibrillation.  She was concerned and hence referred to us for further cardiac evaluation management.  She apparently has not had any more episodes since then.  She denies any associated symptoms of dizziness, chest pain or discomfort or shortness of breath.  She admits to drinking about 1 to 2 cups of coffee a day.She reportedly has some intermittent job-related stress.  She denies any dyspnea, PND, orthopnea pedal edema.  She denies any history of dizziness or syncope.  She is a non-smoker.  No family history of sudden cardiac death or need for pacemaker implantation.  She is reportedly a  prediabetic but no history of hypertension.      No Known Allergies:    Current Outpatient Medications:   •  buPROPion XL (WELLBUTRIN XL) 150 MG 24 hr tablet, Take 1 tablet by mouth Daily., Disp: , Rfl:   •  meloxicam (MOBIC) 7.5 MG tablet, Take 1 tablet by mouth Daily., Disp: , Rfl:   •  Tirzepatide (Mounjaro) 2.5 MG/0.5ML solution pen-injector pen, Inject 0.5 mL under the skin into the appropriate area as directed 1 (One) Time Per Week., Disp: , Rfl:   •  valACYclovir (VALTREX) 1000 MG tablet, Take 2 tablets every 12 hours for one day (Patient taking differently: 2 (Two) Times a Day As Needed. Take 2 tablets every 12 hours for one day), Disp: 4 tablet, Rfl: 5    Past Medical History:   Diagnosis Date   • Anxiety 2015   • H/O cold sores    • Pessary maintenance     Ring #5     Past Surgical History:   Procedure Laterality Date   • BREAST BIOPSY Left 03/2018    benign   • BREAST BIOPSY Right    • INTERVENTIONAL RADIOLOGY PROCEDURE N/A 12/09/2021    Procedure: IR myelogram, lumbar;  Surgeon: Malachi De La Cruz MD;  Location: [facility] INVASIVE LOCATION;  Service: Interventional Radiology;  Laterality: N/A;   • KNEE CARTILAGE SURGERY Right 01/2023    Meniscus repair   • LUMBAR DISC SURGERY  02/2013    L3-L4   • LUMBAR DISCECTOMY FUSION INSTRUMENTATION  12/2013    L3 - L4 with hardware   • MAMMO STEREOTACTIC BREAST BIOPSY 1ST W WO DEVICE Left 03/2018   • MAMMO STEREOTACTIC BREAST BX ADD W WO DEVICE Right 06/05/2012   • MAMMO STEREOTACTIC BREAST BX ADD W WO DEVICE Left 2002     Family History   Problem Relation Age of Onset   • Breast cancer Mother 48        maybe late 70s   • Heart disease Father    • Esophageal cancer Brother    • Endometrial cancer Neg Hx    • Ovarian cancer Neg Hx    • Colon cancer Neg Hx    • Uterine cancer Neg Hx    • Osteoporosis Neg Hx    • Pancreatic cancer Neg Hx      Social History     Tobacco Use   • Smoking status: Never     Passive exposure: Never   • Smokeless tobacco: Never   Vaping Use  "  • Vaping status: Never Used   Substance Use Topics   • Alcohol use: Yes     Comment: occasionally   • Drug use: No     Review of Systems   Eyes:  Positive for visual disturbance.   Cardiovascular:  Positive for palpitations.   Musculoskeletal:  Positive for joint pain.   Genitourinary:  Positive for bladder incontinence.     Objective  Blood pressure 119/69, pulse 81, resp. rate 16, height 162.6 cm (64\"), weight 69.5 kg (153 lb 3.2 oz), SpO2 95%, not currently breastfeeding.  Body mass index is 26.3 kg/m².    Vitals reviewed.   Constitutional:       Appearance: Well-developed.   Eyes:      Conjunctiva/sclera: Conjunctivae normal.   HENT:      Head: Normocephalic.   Neck:      Thyroid: No thyromegaly.      Vascular: No JVD.      Trachea: No tracheal deviation.   Pulmonary:      Effort: No respiratory distress.      Breath sounds: Normal breath sounds. No wheezing. No rales.   Cardiovascular:      PMI at left midclavicular line. Normal rate. Regular rhythm. Normal S1. Normal S2.       Murmurs: There is no murmur.      No gallop.  No click. No rub.   Pulses:     Intact distal pulses.   Edema:     Peripheral edema absent.   Abdominal:      General: Bowel sounds are normal.      Palpations: Abdomen is soft. There is no abdominal mass.      Tenderness: There is no abdominal tenderness.   Musculoskeletal:      Cervical back: Normal range of motion and neck supple. Skin:     General: Skin is warm and dry.   Neurological:      Mental Status: Alert and oriented to person, place, and time.      Cranial Nerves: No cranial nerve deficit.         ECG 12 Lead    Date/Time: 4/22/2024 8:37 AM  Performed by: Ever Kurtz MD    Authorized by: Ever Kurtz MD  Previous ECG: no previous ECG available  Rhythm: sinus rhythm  Conduction: conduction normal  ST Segments: ST segments normal  T Waves: T waves normal    Clinical impression: normal ECG            Assessment & Plan   Diagnosis Plan    Palpitations  Cardiac Event Monitor "    TSH+Free T4    Basic Metabolic Panel    Magnesium          Recommendations  Orders Placed This Encounter   Procedures   • TSH+Free T4   • Basic Metabolic Panel   • Magnesium   • Cardiac Event Monitor   • ECG 12 Lead      Will evaluate further with an event monitor.  Check thyroid profile with TSH and BMP and magnesium level.  I have asked her to cut back on the consumption of caffeinated beverages.    Return in about 6 weeks (around 6/3/2024) for or sooner if needed.    As always,Sukhdev  I appreciate very much the opportunity to participate in the cardiovascular care of your patients. Please do not hesitate to call me with any questions with regards to Zuleyma Keating's evaluation and management.     With Best Regards,        Ever Kurtz MD, [facility]    Please note that portions of this note were completed with a voice recognition program.

## 2024-04-22 NOTE — PROGRESS NOTES
Lasha Espinosa PA  Zuleyma Keating  1960 04/22/2024    Patient Active Problem List   Diagnosis    Incomplete uterovaginal prolapse    Annual GYN exam w/o problem    Anxiety    At high risk for breast cancer    Right hip pain    Lumbar radiculopathy    Generalized anxiety disorder       Dear Sukhdev:    Joe     Zuleyma Keating is a 63 y.o. female with the problems as listed above, presents    Chief complaint: Recurrent palpitations    History of Present Illness: Ms. Zuleyma Keating is a pleasant 63-year-old  female with no history of known heart disease or cardiac arrhythmias, presents with complaints of having palpitation with rapid heartbeat about a month ago that lasted for few minutes.  She had some recording on the Apple Watch that read reportedly as atrial fibrillation.  She was concerned and hence referred to us for further cardiac evaluation management.  She apparently has not had any more episodes since then.  She denies any associated symptoms of dizziness, chest pain or discomfort or shortness of breath.  She admits to drinking about 1 to 2 cups of coffee a day.She reportedly has some intermittent job-related stress.  She denies any dyspnea, PND, orthopnea pedal edema.  She denies any history of dizziness or syncope.  She is a non-smoker.  No family history of sudden cardiac death or need for pacemaker implantation.  She is reportedly a prediabetic but no history of hypertension.      No Known Allergies:    Current Outpatient Medications:     buPROPion XL (WELLBUTRIN XL) 150 MG 24 hr tablet, Take 1 tablet by mouth Daily., Disp: , Rfl:     meloxicam (MOBIC) 7.5 MG tablet, Take 1 tablet by mouth Daily., Disp: , Rfl:     Tirzepatide (Mounjaro) 2.5 MG/0.5ML solution pen-injector pen, Inject 0.5 mL under the skin into the appropriate area as directed 1 (One) Time Per Week., Disp: , Rfl:     valACYclovir (VALTREX) 1000 MG tablet, Take 2 tablets every 12 hours for one day (Patient  "taking differently: 2 (Two) Times a Day As Needed. Take 2 tablets every 12 hours for one day), Disp: 4 tablet, Rfl: 5    Past Medical History:   Diagnosis Date    Anxiety 2015    H/O cold sores     Pessary maintenance     Ring #5     Past Surgical History:   Procedure Laterality Date    BREAST BIOPSY Left 03/2018    benign    BREAST BIOPSY Right     INTERVENTIONAL RADIOLOGY PROCEDURE N/A 12/09/2021    Procedure: IR myelogram, lumbar;  Surgeon: Malachi De La Cruz MD;  Location: [facility] INVASIVE LOCATION;  Service: Interventional Radiology;  Laterality: N/A;    KNEE CARTILAGE SURGERY Right 01/2023    Meniscus repair    LUMBAR DISC SURGERY  02/2013    L3-L4    LUMBAR DISCECTOMY FUSION INSTRUMENTATION  12/2013    L3 - L4 with hardware    MAMMO STEREOTACTIC BREAST BIOPSY 1ST W WO DEVICE Left 03/2018    MAMMO STEREOTACTIC BREAST BX ADD W WO DEVICE Right 06/05/2012    MAMMO STEREOTACTIC BREAST BX ADD W WO DEVICE Left 2002     Family History   Problem Relation Age of Onset    Breast cancer Mother 48        maybe late 70s    Heart disease Father     Esophageal cancer Brother     Endometrial cancer Neg Hx     Ovarian cancer Neg Hx     Colon cancer Neg Hx     Uterine cancer Neg Hx     Osteoporosis Neg Hx     Pancreatic cancer Neg Hx      Social History     Tobacco Use    Smoking status: Never     Passive exposure: Never    Smokeless tobacco: Never   Vaping Use    Vaping status: Never Used   Substance Use Topics    Alcohol use: Yes     Comment: occasionally    Drug use: No     Review of Systems   Eyes:  Positive for visual disturbance.   Cardiovascular:  Positive for palpitations.   Musculoskeletal:  Positive for joint pain.   Genitourinary:  Positive for bladder incontinence.     Objective   Blood pressure 119/69, pulse 81, resp. rate 16, height 162.6 cm (64\"), weight 69.5 kg (153 lb 3.2 oz), SpO2 95%, not currently breastfeeding.  Body mass index is 26.3 kg/m².    Vitals reviewed.   Constitutional:       Appearance: " Well-developed.   Eyes:      Conjunctiva/sclera: Conjunctivae normal.   HENT:      Head: Normocephalic.   Neck:      Thyroid: No thyromegaly.      Vascular: No JVD.      Trachea: No tracheal deviation.   Pulmonary:      Effort: No respiratory distress.      Breath sounds: Normal breath sounds. No wheezing. No rales.   Cardiovascular:      PMI at left midclavicular line. Normal rate. Regular rhythm. Normal S1. Normal S2.       Murmurs: There is no murmur.      No gallop.  No click. No rub.   Pulses:     Intact distal pulses.   Edema:     Peripheral edema absent.   Abdominal:      General: Bowel sounds are normal.      Palpations: Abdomen is soft. There is no abdominal mass.      Tenderness: There is no abdominal tenderness.   Musculoskeletal:      Cervical back: Normal range of motion and neck supple. Skin:     General: Skin is warm and dry.   Neurological:      Mental Status: Alert and oriented to person, place, and time.      Cranial Nerves: No cranial nerve deficit.         ECG 12 Lead    Date/Time: 4/22/2024 8:37 AM  Performed by: Ever Kurtz MD    Authorized by: Ever Kurtz MD  Previous ECG: no previous ECG available  Rhythm: sinus rhythm  Conduction: conduction normal  ST Segments: ST segments normal  T Waves: T waves normal    Clinical impression: normal ECG            Assessment & Plan    Diagnosis Plan    Palpitations  Cardiac Event Monitor    TSH+Free T4    Basic Metabolic Panel    Magnesium          Recommendations  Orders Placed This Encounter   Procedures    TSH+Free T4    Basic Metabolic Panel    Magnesium    Cardiac Event Monitor    ECG 12 Lead      Will evaluate further with an event monitor.  Check thyroid profile with TSH and BMP and magnesium level.  I have asked her to cut back on the consumption of caffeinated beverages.    Return in about 6 weeks (around 6/3/2024) for or sooner if needed.    As always,Sukhdev  I appreciate very much the opportunity to participate in the cardiovascular care  of your patients. Please do not hesitate to call me with any questions with regards to Zuleyma Keating's evaluation and management.     With Best Regards,        Ever Kurtz MD, FACC    Please note that portions of this note were completed with a voice recognition program.

## 2024-04-23 ENCOUNTER — PATIENT ROUNDING (BHMG ONLY) (OUTPATIENT)
Dept: CARDIOLOGY | Facility: CLINIC | Age: 64
End: 2024-04-23
Payer: COMMERCIAL

## 2024-04-23 NOTE — PROGRESS NOTES
A [employer] message has been sent to the patient for patient rounding for [facility].

## 2024-06-04 ENCOUNTER — OFFICE VISIT (OUTPATIENT)
Dept: CARDIOLOGY | Facility: CLINIC | Age: 64
End: 2024-06-04
Payer: COMMERCIAL

## 2024-06-04 VITALS
WEIGHT: 150.8 LBS | OXYGEN SATURATION: 98 % | DIASTOLIC BLOOD PRESSURE: 78 MMHG | HEART RATE: 77 BPM | BODY MASS INDEX: 25.74 KG/M2 | SYSTOLIC BLOOD PRESSURE: 118 MMHG | HEIGHT: 64 IN

## 2024-06-04 DIAGNOSIS — I47.29 NSVT (NONSUSTAINED VENTRICULAR TACHYCARDIA): Primary | ICD-10-CM

## 2024-06-04 PROCEDURE — 99214 OFFICE O/P EST MOD 30 MIN: CPT | Performed by: NURSE PRACTITIONER

## 2024-06-04 RX ORDER — METOPROLOL SUCCINATE 25 MG/1
25 TABLET, EXTENDED RELEASE ORAL DAILY
Qty: 30 TABLET | Refills: 2 | Status: SHIPPED | OUTPATIENT
Start: 2024-06-04

## 2024-06-04 NOTE — PROGRESS NOTES
Lasha Espinosa PA  Zuleyma Keating  1960 06/04/2024    Patient Active Problem List   Diagnosis    Incomplete uterovaginal prolapse    Annual GYN exam w/o problem    Anxiety    At high risk for breast cancer    Right hip pain    Lumbar radiculopathy    Generalized anxiety disorder       Dear Lasha Espinosa PA:    Subjective     Chief Complaint   Patient presents with    Follow-up     5/6 week follow up  Test results     Med Management       Verbal             History of Present Illness:    Zuleyma Keating is a 63 y.o. female with a past medical history of palpitations and abnormal heart rhythm noted on her Apple Watch. She was evaluated further with a cardiac event monitor.  This revealed predominantly sinus rhythm with an average heart rate of 81, minimum heart rate of 50, and and maximum heart rate of 173.  There were PVCs with a PVC burden of less than 1%.  However, there were 2 runs of wide-complex tachycardia (possible V. tach) at rates up to 173 bpm lasting for 6-12 beats.  Symptoms of heart racing correlated with sinus rhythm.  She denies any more palpitations since her last visit here.  Denies any chest pains or shortness of breath.  Potassium and magnesium level were normal.          No Known Allergies:      Current Outpatient Medications:     buPROPion XL (WELLBUTRIN XL) 150 MG 24 hr tablet, Take 1 tablet by mouth Daily., Disp: , Rfl:     meloxicam (MOBIC) 7.5 MG tablet, Take 1 tablet by mouth Daily., Disp: , Rfl:     Tirzepatide (Mounjaro) 2.5 MG/0.5ML solution pen-injector pen, Inject 0.5 mL under the skin into the appropriate area as directed 1 (One) Time Per Week., Disp: , Rfl:     valACYclovir (VALTREX) 1000 MG tablet, Take 2 tablets every 12 hours for one day (Patient taking differently: 2 (Two) Times a Day As Needed. Take 2 tablets every 12 hours for one day), Disp: 4 tablet, Rfl: 5    metoprolol succinate XL (TOPROL-XL) 25 MG 24 hr tablet, Take 1 tablet by mouth Daily.,  "Disp: 30 tablet, Rfl: 2      The following portions of the patient's history were reviewed and updated as appropriate: allergies, current medications, past family history, past medical history, past social history, past surgical history and problem list.    Social History     Tobacco Use    Smoking status: Never     Passive exposure: Never    Smokeless tobacco: Never   Vaping Use    Vaping status: Never Used   Substance Use Topics    Alcohol use: Yes     Comment: occasionally    Drug use: No       Review of Systems   Constitutional: Negative for decreased appetite and malaise/fatigue.   Cardiovascular:  Negative for chest pain, dyspnea on exertion and palpitations.   Respiratory:  Negative for cough and shortness of breath.        Objective   Vitals:    06/04/24 0844   BP: 118/78   BP Location: Left arm   Patient Position: Sitting   Cuff Size: Adult   Pulse: 77   SpO2: 98%   Weight: 68.4 kg (150 lb 12.8 oz)   Height: 162.6 cm (64.02\")     Body mass index is 25.87 kg/m².        Vitals reviewed.   Constitutional:       Appearance: Healthy appearance. Well-developed and not in distress.   HENT:      Head: Normocephalic and atraumatic.   Neck:      Vascular: No carotid bruit or JVD.   Pulmonary:      Effort: Pulmonary effort is normal.      Breath sounds: Normal breath sounds. No wheezing. No rales.   Cardiovascular:      Normal rate. Regular rhythm.      Murmurs: There is no murmur.      . No S3 and S4 gallop.   Edema:     Peripheral edema absent.   Abdominal:      General: Bowel sounds are normal.      Palpations: Abdomen is soft.   Skin:     General: Skin is warm and dry.   Neurological:      Mental Status: Alert, oriented to person, place, and time and oriented to person, place and time.   Psychiatric:         Mood and Affect: Mood normal.         Behavior: Behavior normal.         Lab Results   Component Value Date     04/22/2024    K 4.0 04/22/2024     04/22/2024    CO2 27.2 04/22/2024    BUN 9 " 04/22/2024    CREATININE 0.66 04/22/2024    GLUCOSE 89 04/22/2024    CALCIUM 9.3 04/22/2024    AST 20 01/31/2019    ALT 30 01/31/2019    ALKPHOS 118 (H) 01/31/2019    LABIL2 1.3 (L) 07/21/2015     Lab Results   Component Value Date    WBC 7.12 01/31/2019    HGB 15.2 01/31/2019    HCT 45.0 01/31/2019     01/31/2019     Lab Results   Component Value Date    TSH 3.780 04/22/2024    CHLPL 204 (H) 07/21/2015    TRIG 98 01/31/2019    HDL 61 01/31/2019     (H) 01/31/2019                  Results for orders placed during the hospital encounter of 12/09/21    Cardiac Catheterization/Vascular Study    Narrative  Clinical Indication: 60-year-old female status post prior L4/5 laminectomy and fusion.  She presents with new right hip and lower extremity pain.    :  Dr. Ly Given    Procedures:  Lumbar myelogram via lumbar puncture.    Access:  22-gauge spinal needle in the thecal sac at the lower lumbar region.    Contrast: Isovue 200    Estimated Blood Loss:  Negligible    Complication:  None Apparent.    Technique:  Formal written consent for the procedure was obtained from the patient's/patient's family after explaining risks to include bleeding, infection, contrast reaction, spinal nerve/cord injury, and headache.  The lower lumbar region was prepped and draped in the usual, sterile fashion.  Local anesthesia with 1% lidocaine infiltration was achieved.  Utilizing fluoroscopic guidance, a 22-gauge spinal needle was placed into the thecal sac of the lower lumbar region without difficulty.  Following a clear return of CSF, approximately 15 milliliters of the Isovue-200 was instilled into the thecal sac without complication.  Conventional myelographic images of the lumbar spine were obtained.  Additionally, upright flexion/extension views of the lumbar spine were obtained.  The patient tolerated the procedure well and without apparent complication.    Findings: The conventional myelographic images of the  lumbar spine demonstrate changes related to prior PLIF with left-sided pedicle screw/philip fusion.  There is mild blunting of the right L5 nerve root sleeve at the L4/5 level, likely representing post-operative scar; however, there is no recurrent spinal stenosis.  There is mild adjacent level disease at the L3/4 level, with minimal (grade 1) anterolisthesis of L3 relative to L4, slightly exacerbated in the upright flexed position, with reduction with extension.  However, there is only a mild degree of spinal stenosis at the L3/4 level, without appreciable nerve root impingement.  All remaining levels are without significant degenerative change, spinal stenosis, or concern for focal disc herniation.    Impression  1.  Status post PLIF at the L3/4 level.  There is mild blunting of the right L5 nerve root sleeve, deemed most likely represent post-operative scar tissue.  2.  Mild adjacent level disease at the L3/4 level with minimal spondylolisthesis and a mild degree of spinal stenosis in the upright extended position, but without appreciable nerve root impingement.        Procedures      Assessment & Plan    Diagnosis Plan   1. NSVT (nonsustained ventricular tachycardia)  Treadmill Stress Test    Adult Transthoracic Echo Complete W/ Cont if Necessary Per Protocol    metoprolol succinate XL (TOPROL-XL) 25 MG 24 hr tablet                   Recommendations:    NSVT-2 separate runs, 6-12 beats noted on recent monitor.  Will order a treadmill stress test to rule out any occult myocardial ischemia.  Echocardiogram ordered to evaluate LV function.  Will start metoprolol succinate 25 mg daily.  Follow-up in 6 weeks or sooner if needed.        Return in about 6 weeks (around 7/16/2024) for Recheck.    As always, I appreciate very much the opportunity to participate in the cardiovascular care of your patients.      With Best Regards,    FABIOLA De Leon

## 2024-06-11 ENCOUNTER — TELEPHONE (OUTPATIENT)
Dept: CARDIOLOGY | Facility: CLINIC | Age: 64
End: 2024-06-11
Payer: COMMERCIAL

## 2024-06-11 NOTE — TELEPHONE ENCOUNTER
Called pt back and advised her that in her chart we have that she was starting her on 25 mg QD. Pt stated during the visit they talked about cutting it in half to 12.5 mg at first. I advised her that it would be once a day also but I will check to make sure. She expressed understanding.

## 2024-07-30 ENCOUNTER — HOSPITAL ENCOUNTER (OUTPATIENT)
Dept: CARDIOLOGY | Facility: HOSPITAL | Age: 64
Discharge: HOME | End: 2024-07-30
Payer: COMMERCIAL

## 2024-07-30 DIAGNOSIS — I47.29 NSVT (NONSUSTAINED VENTRICULAR TACHYCARDIA): ICD-10-CM

## 2024-07-30 LAB
BH CV STRESS BP STAGE 1: NORMAL
BH CV STRESS BP STAGE 2: NORMAL
BH CV STRESS DURATION MIN STAGE 1: 3
BH CV STRESS DURATION MIN STAGE 2: 2
BH CV STRESS DURATION SEC STAGE 1: 0
BH CV STRESS DURATION SEC STAGE 2: 43
BH CV STRESS GRADE STAGE 1: 10
BH CV STRESS GRADE STAGE 2: 12
BH CV STRESS HR STAGE 1: 133
BH CV STRESS HR STAGE 2: 164
BH CV STRESS METS STAGE 1: 5
BH CV STRESS METS STAGE 2: 7.5
BH CV STRESS PROTOCOL 1: NORMAL
BH CV STRESS RECOVERY BP: NORMAL MMHG
BH CV STRESS RECOVERY HR: 99 BPM
BH CV STRESS SPEED STAGE 1: 1.7
BH CV STRESS SPEED STAGE 2: 2.5
BH CV STRESS STAGE 1: 1
BH CV STRESS STAGE 2: 2
MAXIMAL PREDICTED HEART RATE: 158 BPM
PERCENT MAX PREDICTED HR: 103.8 %
STRESS BASELINE BP: NORMAL MMHG
STRESS BASELINE HR: 90 BPM
STRESS PERCENT HR: 122 %
STRESS POST ESTIMATED WORKLOAD: 7 METS
STRESS POST EXERCISE DUR MIN: 5 MIN
STRESS POST EXERCISE DUR SEC: 43 SEC
STRESS POST PEAK BP: NORMAL MMHG
STRESS POST PEAK HR: 164 BPM
STRESS TARGET HR: 134 BPM

## 2024-07-30 PROCEDURE — 93017 CV STRESS TEST TRACING ONLY: CPT

## 2024-07-30 PROCEDURE — 93306 TTE W/DOPPLER COMPLETE: CPT

## 2024-08-02 ENCOUNTER — OFFICE VISIT (OUTPATIENT)
Dept: CARDIOLOGY | Facility: CLINIC | Age: 64
End: 2024-08-02
Payer: COMMERCIAL

## 2024-08-02 VITALS
SYSTOLIC BLOOD PRESSURE: 119 MMHG | OXYGEN SATURATION: 96 % | HEIGHT: 64 IN | RESPIRATION RATE: 18 BRPM | WEIGHT: 150.8 LBS | DIASTOLIC BLOOD PRESSURE: 71 MMHG | HEART RATE: 75 BPM | BODY MASS INDEX: 25.74 KG/M2

## 2024-08-02 DIAGNOSIS — I47.29 NSVT (NONSUSTAINED VENTRICULAR TACHYCARDIA): Primary | ICD-10-CM

## 2024-08-02 LAB
BH CV ECHO MEAS - AO MAX PG: 7.3 MMHG
BH CV ECHO MEAS - AO MEAN PG: 4 MMHG
BH CV ECHO MEAS - AO ROOT DIAM: 2.7 CM
BH CV ECHO MEAS - AO V2 MAX: 135 CM/SEC
BH CV ECHO MEAS - AO V2 VTI: 24.2 CM
BH CV ECHO MEAS - EDV(CUBED): 68.9 ML
BH CV ECHO MEAS - EDV(MOD-SP4): 45.7 ML
BH CV ECHO MEAS - EF(MOD-SP4): 61.9 %
BH CV ECHO MEAS - ESV(CUBED): 40 ML
BH CV ECHO MEAS - ESV(MOD-SP4): 17.4 ML
BH CV ECHO MEAS - FS: 16.6 %
BH CV ECHO MEAS - IVS/LVPW: 0.78 CM
BH CV ECHO MEAS - IVSD: 1.22 CM
BH CV ECHO MEAS - LA DIMENSION: 2.5 CM
BH CV ECHO MEAS - LAT PEAK E' VEL: 9.5 CM/SEC
BH CV ECHO MEAS - LV DIASTOLIC VOL/BSA (35-75): 26.4 CM2
BH CV ECHO MEAS - LV MASS(C)D: 215.4 GRAMS
BH CV ECHO MEAS - LV SYSTOLIC VOL/BSA (12-30): 10.1 CM2
BH CV ECHO MEAS - LVIDD: 4.1 CM
BH CV ECHO MEAS - LVIDS: 3.4 CM
BH CV ECHO MEAS - LVOT AREA: 3.1 CM2
BH CV ECHO MEAS - LVOT DIAM: 2 CM
BH CV ECHO MEAS - LVPWD: 1.57 CM
BH CV ECHO MEAS - MED PEAK E' VEL: 7.6 CM/SEC
BH CV ECHO MEAS - MV A MAX VEL: 84.2 CM/SEC
BH CV ECHO MEAS - MV E MAX VEL: 68.4 CM/SEC
BH CV ECHO MEAS - MV E/A: 0.81
BH CV ECHO MEAS - PA ACC TIME: 0.11 SEC
BH CV ECHO MEAS - SV(MOD-SP4): 28.3 ML
BH CV ECHO MEAS - SVI(MOD-SP4): 16.3 ML/M2
BH CV ECHO MEAS - TAPSE (>1.6): 1.78 CM
BH CV ECHO MEASUREMENTS AVERAGE E/E' RATIO: 8
LEFT ATRIUM VOLUME INDEX: 18.6 ML/M2

## 2024-08-02 PROCEDURE — 99213 OFFICE O/P EST LOW 20 MIN: CPT | Performed by: NURSE PRACTITIONER

## 2024-08-02 NOTE — PROGRESS NOTES
Lasha Espinosa PA  Zuleyma Keating  1960 08/02/2024    Patient Active Problem List   Diagnosis    Incomplete uterovaginal prolapse    Annual GYN exam w/o problem    Anxiety    At high risk for breast cancer    Right hip pain    Lumbar radiculopathy    Generalized anxiety disorder       Dear Lasha Espinosa PA:    Subjective     Chief Complaint   Patient presents with    Follow-up     Stress and ECHO         History of Present Illness:    Zuleyma Keating is a 63 y.o. female with a past medical history of NSVT noted on holter monitor. She presents today for cardiology follow up. She did undergo treadmill stress test achieving 103.8% target heart rate, exercising for 5 minutes and 43 seconds. There were non specific ST-T wave changes noted on stress. Echo has been done but pending. She denies any palpitations or chest pains. Symptoms did improve after starting metoprolol.          No Known Allergies:      Current Outpatient Medications:     buPROPion XL (WELLBUTRIN XL) 150 MG 24 hr tablet, Take 1 tablet by mouth Daily., Disp: , Rfl:     meloxicam (MOBIC) 7.5 MG tablet, Take 1 tablet by mouth Daily., Disp: , Rfl:     metoprolol succinate XL (TOPROL-XL) 25 MG 24 hr tablet, Take 1 tablet by mouth Daily., Disp: 30 tablet, Rfl: 2    Tirzepatide (Mounjaro) 2.5 MG/0.5ML solution pen-injector pen, Inject 0.5 mL under the skin into the appropriate area as directed 1 (One) Time Per Week., Disp: , Rfl:     valACYclovir (VALTREX) 1000 MG tablet, Take 2 tablets every 12 hours for one day (Patient taking differently: 2 (Two) Times a Day As Needed. Take 2 tablets every 12 hours for one day), Disp: 4 tablet, Rfl: 5      The following portions of the patient's history were reviewed and updated as appropriate: allergies, current medications, past family history, past medical history, past social history, past surgical history and problem list.    Social History     Tobacco Use    Smoking status: Never     Passive  "exposure: Never    Smokeless tobacco: Never   Vaping Use    Vaping status: Never Used   Substance Use Topics    Alcohol use: Yes     Comment: occasionally    Drug use: No       Review of Systems   Constitutional: Negative for decreased appetite and malaise/fatigue.   Cardiovascular:  Positive for palpitations. Negative for chest pain and dyspnea on exertion.   Respiratory:  Negative for cough and shortness of breath.        Objective   Vitals:    08/02/24 0929   BP: 119/71   BP Location: Left arm   Patient Position: Sitting   Cuff Size: Adult   Pulse: 75   Resp: 18   SpO2: 96%   Weight: 68.4 kg (150 lb 12.8 oz)   Height: 162.6 cm (64.02\")     Body mass index is 25.87 kg/m².        Vitals reviewed.   Constitutional:       Appearance: Healthy appearance. Well-developed and not in distress.   HENT:      Head: Normocephalic and atraumatic.   Neck:      Vascular: No carotid bruit or JVD.   Pulmonary:      Effort: Pulmonary effort is normal.      Breath sounds: Normal breath sounds. No wheezing. No rales.   Cardiovascular:      Normal rate. Regular rhythm.      Murmurs: There is no murmur.      . No S3 and S4 gallop.   Edema:     Peripheral edema absent.   Abdominal:      General: Bowel sounds are normal.      Palpations: Abdomen is soft.   Skin:     General: Skin is warm and dry.   Neurological:      Mental Status: Alert, oriented to person, place, and time and oriented to person, place and time.   Psychiatric:         Mood and Affect: Mood normal.         Behavior: Behavior normal.         Lab Results   Component Value Date     04/22/2024    K 4.0 04/22/2024     04/22/2024    CO2 27.2 04/22/2024    BUN 9 04/22/2024    CREATININE 0.66 04/22/2024    GLUCOSE 89 04/22/2024    CALCIUM 9.3 04/22/2024    AST 20 01/31/2019    ALT 30 01/31/2019    ALKPHOS 118 (H) 01/31/2019    LABIL2 1.3 (L) 07/21/2015     Lab Results   Component Value Date    WBC 7.12 01/31/2019    HGB 15.2 01/31/2019    HCT 45.0 01/31/2019    PLT " 348 01/31/2019     Lab Results   Component Value Date    TSH 3.780 04/22/2024    CHLPL 204 (H) 07/21/2015    TRIG 98 01/31/2019    HDL 61 01/31/2019     (H) 01/31/2019             Results for orders placed during the hospital encounter of 07/30/24    Treadmill Stress Test    Interpretation Summary    A stress test was performed following the [facility] protocol.    Exercise duration (min)5 minExercise duration (sec)43 secEstimated workload7 METS    Baseline VitalsBaseline HR90 bpmBaseline /88 mmHgPeak Stress VitalsPeak  bpmPeak /78 mmHgRecovery VitalsRecovery HR99 bpmRecovery /77 mmHgExercise DataTarget HR (85%)134 bpmMax. Pred. HR (100%)158 bpmPercent Max Pred .8 %    Patient denied any chest discomfort during exercise,    Non-specific ST-T wave changes noted during stress.    Findings consistent with an indeterminate ECG stress test.    Comments: May consider a stress imaging study if clinically warranted as the EKG during the stress was nondiagnostic.       Results for orders placed during the hospital encounter of 12/09/21    Cardiac Catheterization/Vascular Study    Narrative  Clinical Indication: 60-year-old female status post prior L4/5 laminectomy and fusion.  She presents with new right hip and lower extremity pain.    :  Dr. Ly Given    Procedures:  Lumbar myelogram via lumbar puncture.    Access:  22-gauge spinal needle in the thecal sac at the lower lumbar region.    Contrast: Isovue 200    Estimated Blood Loss:  Negligible    Complication:  None Apparent.    Technique:  Formal written consent for the procedure was obtained from the patient's/patient's family after explaining risks to include bleeding, infection, contrast reaction, spinal nerve/cord injury, and headache.  The lower lumbar region was prepped and draped in the usual, sterile fashion.  Local anesthesia with 1% lidocaine infiltration was achieved.  Utilizing fluoroscopic guidance, a 22-gauge spinal  needle was placed into the thecal sac of the lower lumbar region without difficulty.  Following a clear return of CSF, approximately 15 milliliters of the Isovue-200 was instilled into the thecal sac without complication.  Conventional myelographic images of the lumbar spine were obtained.  Additionally, upright flexion/extension views of the lumbar spine were obtained.  The patient tolerated the procedure well and without apparent complication.    Findings: The conventional myelographic images of the lumbar spine demonstrate changes related to prior PLIF with left-sided pedicle screw/philip fusion.  There is mild blunting of the right L5 nerve root sleeve at the L4/5 level, likely representing post-operative scar; however, there is no recurrent spinal stenosis.  There is mild adjacent level disease at the L3/4 level, with minimal (grade 1) anterolisthesis of L3 relative to L4, slightly exacerbated in the upright flexed position, with reduction with extension.  However, there is only a mild degree of spinal stenosis at the L3/4 level, without appreciable nerve root impingement.  All remaining levels are without significant degenerative change, spinal stenosis, or concern for focal disc herniation.    Impression  1.  Status post PLIF at the L3/4 level.  There is mild blunting of the right L5 nerve root sleeve, deemed most likely represent post-operative scar tissue.  2.  Mild adjacent level disease at the L3/4 level with minimal spondylolisthesis and a mild degree of spinal stenosis in the upright extended position, but without appreciable nerve root impingement.        Procedures    Assessment & Plan    Diagnosis Plan   1. NSVT (nonsustained ventricular tachycardia)                 Recommendations:  NSVT - asymptomatic with metoprolol. Echocardiogram results pending. Treadmill stress test did have nonspecific ST-T wave changes. She has no anginal symptoms and no risk factors for coronary artery disease. We discussed  pursuing nuclear stress test, but decided to hold off for now since she has no anginal symptoms. If Echo shows any abnormalities, can consider nuclear stress at that time.  Follow up in 3 months or sooner if needed.      Return in about 3 months (around 11/2/2024) for Recheck.    As always, I appreciate very much the opportunity to participate in the cardiovascular care of your patients.      With Best Regards,    FABIOLA De Leon

## 2024-08-26 ENCOUNTER — OFFICE VISIT (OUTPATIENT)
Dept: OBSTETRICS AND GYNECOLOGY | Facility: CLINIC | Age: 64
End: 2024-08-26
Payer: COMMERCIAL

## 2024-08-26 VITALS
BODY MASS INDEX: 24.53 KG/M2 | DIASTOLIC BLOOD PRESSURE: 70 MMHG | WEIGHT: 143 LBS | SYSTOLIC BLOOD PRESSURE: 118 MMHG | RESPIRATION RATE: 16 BRPM

## 2024-08-26 DIAGNOSIS — Z01.419 ENCOUNTER FOR WELL WOMAN EXAM WITH ROUTINE GYNECOLOGICAL EXAM: Primary | ICD-10-CM

## 2024-08-26 DIAGNOSIS — N81.2 INCOMPLETE UTEROVAGINAL PROLAPSE: ICD-10-CM

## 2024-08-26 DIAGNOSIS — Z12.31 SCREENING MAMMOGRAM FOR BREAST CANCER: ICD-10-CM

## 2024-08-26 PROCEDURE — 99212 OFFICE O/P EST SF 10 MIN: CPT

## 2024-08-26 RX ORDER — ZOLPIDEM TARTRATE 10 MG/1
10 TABLET ORAL
COMMUNITY
Start: 2024-08-08

## 2024-08-26 RX ORDER — BUSPIRONE HYDROCHLORIDE 10 MG/1
1 TABLET ORAL EVERY 12 HOURS SCHEDULED
COMMUNITY
Start: 2024-08-08

## 2024-08-26 RX ORDER — ONDANSETRON 4 MG/1
1 TABLET, FILM COATED ORAL DAILY
COMMUNITY
Start: 2024-08-08

## 2024-08-26 RX ORDER — PANTOPRAZOLE SODIUM 40 MG/1
1 TABLET, DELAYED RELEASE ORAL DAILY
COMMUNITY
Start: 2024-07-11

## 2024-08-26 NOTE — PROGRESS NOTES
Subjective   Chief Complaint   Patient presents with    Annual Exam     Zuleyma Keating is a 63 y.o. year old  menopausal female presenting to be seen for her annual exam.  This past year she has not been on hormone replacement therapy.  There has not been vaginal bleeding in the last 12 months.  Menopausal symptoms are not present.    SEXUAL Hx:  She is currently sexually active.  In the past year there there has been NO new sexual partners.    Condoms are never used.  She would not like to be screened for STD's at today's exam.  [place] is painful: no  HEALTH Hx:  She exercises regularly: yes. She walks a lot  She wears her seat belt: yes.  She has concerns about domestic violence: no.  She has noticed changes in height: no.  OTHER THINGS SHE WANTS TO DISCUSS TODAY:  She would like her pessary replaced as she has noticed some increase in prolapse symptoms when she is on her feet. She had previously been fitted for one by Dr Walker but is unable to place it herself. We were able to find documentation of what size she had been fitted for and she would like to have one placed today.     The following portions of the patient's history were reviewed and updated as appropriate:problem list, current medications, allergies, past family history, past medical history, past social history, and past surgical history.    Social History    Tobacco Use      Smoking status: Never        Passive exposure: Never      Smokeless tobacco: Never    Past Medical History:   Diagnosis Date    Anxiety     H/O cold sores     Pessary maintenance     Ring #5     Past Surgical History:   Procedure Laterality Date    BREAST BIOPSY Left 2018    benign    BREAST BIOPSY Right     INTERVENTIONAL RADIOLOGY PROCEDURE N/A 2021    Procedure: IR myelogram, lumbar;  Surgeon: Malachi De La Cruz MD;  Location: [facility] INVASIVE LOCATION;  Service: Interventional Radiology;  Laterality: N/A;    KNEE CARTILAGE SURGERY Right  01/2023    Meniscus repair    LUMBAR DISC SURGERY  02/2013    L3-L4    LUMBAR DISCECTOMY FUSION INSTRUMENTATION  12/2013    L3 - L4 with hardware    MAMMO STEREOTACTIC BREAST BIOPSY 1ST W WO DEVICE Left 03/2018    MAMMO STEREOTACTIC BREAST BX ADD W WO DEVICE Right 06/05/2012    MAMMO STEREOTACTIC BREAST BX ADD W WO DEVICE Left 2002         Review of Systems   Constitutional: Negative.  Negative for appetite change and diaphoresis.   Respiratory: Negative.     Cardiovascular: Negative.    Gastrointestinal: Negative.  Negative for abdominal distention, blood in stool, GERD and indigestion.   Endocrine: Negative.    Genitourinary:  Positive for pelvic pressure. Negative for breast discharge, breast lump, breast pain, dysuria, hematuria and urinary incontinence.   Skin: Negative.           Objective   /70   Resp 16   Wt 64.9 kg (143 lb)   LMP  (LMP Unknown)   BMI 24.53 kg/m²     Physical Exam  Vitals and nursing note reviewed. Exam conducted with a chaperone present.   Constitutional:       Appearance: Normal appearance.   Cardiovascular:      Rate and Rhythm: Normal rate and regular rhythm.      Heart sounds: Normal heart sounds.   Pulmonary:      Effort: Pulmonary effort is normal.      Breath sounds: Normal breath sounds.   Chest:   Breasts:     Right: Normal.      Left: Normal.   Abdominal:      Palpations: Abdomen is soft.   Genitourinary:     General: Normal vulva.      Exam position: Lithotomy position.      Vagina: Normal. Prolapsed vaginal walls present.      Cervix: Normal.      Uterus: Normal.       Adnexa: Right adnexa normal and left adnexa normal.      Rectum: Normal.      Comments: Grade 2 prolapse noted  Rectal exam not done but appears normal visually  Neurological:      Mental Status: She is alert and oriented to person, place, and time.   Psychiatric:         Mood and Affect: Mood normal.         Behavior: Behavior normal.         Thought Content: Thought content normal.         Judgment:  Judgment normal.            Diagnoses and all orders for this visit:    1. Encounter for well woman exam with routine gynecological exam (Primary)  -     LIQUID-BASED PAP SMEAR WITH HPV GENOTYPING REGARDLESS OF INTERPRETATION ([facility]); Future  -     LIQUID-BASED PAP SMEAR WITH HPV GENOTYPING REGARDLESS OF INTERPRETATION ([facility])    2. Screening mammogram for breast cancer  -     Mammo Screening Digital Tomosynthesis Bilateral With CAD; Future    3. Incomplete uterovaginal prolapse  -     Pessary    Able to obtaine and replace pessary today without difficulty. She was able to walk, squat, and void with it in. She would like to be seen in 3 months for maintenance. Recommended she reach out sooner if she has any problems or concerns.     She is up to date on all relevant gynecologic and colorectal screenings Mammogram is due 9/2024, colonoscopy 2/2025    The importance of keeping all planned follow-up and taking all medications as prescribed was emphasized.    Today I discussed with Zuleyma the total recommended calcium intake for a post-menopausal female is 1200 mg.  Ideally this should be from dietary sources.  I reviewed calcium content in various foods including milk, fortified orange juice and yogurt.  If she cannot get sufficient calcium through dietary means, it is recommended to supplement with either a multivitamin or calcium to reach her daily goal.  I also reviewed the difference in the bioavailability of calcium carbonate and calcium citrate containing supplements and the importance of taking calcium carbonate containing products with food. Finally, vitamin D's role in calcium absorption was reviewed and a total daily vitamin D intake of 600 units was recommended.    Her vaccine record was reviewed and updated.    I discussed with Zuleyma that she may be behind on needed vaccinations for  vaccines are up to date .  She may be able to obtain these vaccinations at her local pharmacy OR speak about  obtaining them with her primary care.  If she does obtain her vaccines, I have asked Zuleyma to let us know the date each vaccine was obtained so that her medical record could be updated in our system.    No orders of the defined types were placed in this encounter.           Return in about 3 months (around 11/26/2024) for Pessary maintenance.    Ritika Crowder, APRN  August 26, 2024

## 2024-08-31 LAB — REF LAB TEST METHOD: NORMAL

## 2024-09-17 ENCOUNTER — OFFICE VISIT (OUTPATIENT)
Dept: OBSTETRICS AND GYNECOLOGY | Facility: CLINIC | Age: 64
End: 2024-09-17
Payer: COMMERCIAL

## 2024-09-17 VITALS
BODY MASS INDEX: 25.56 KG/M2 | WEIGHT: 149 LBS | DIASTOLIC BLOOD PRESSURE: 70 MMHG | SYSTOLIC BLOOD PRESSURE: 122 MMHG | RESPIRATION RATE: 16 BRPM

## 2024-09-17 DIAGNOSIS — Z46.89 ENCOUNTER FOR PESSARY MAINTENANCE: Primary | ICD-10-CM

## 2024-09-17 DIAGNOSIS — N76.0 ACUTE VAGINITIS: ICD-10-CM

## 2024-09-17 PROCEDURE — 99213 OFFICE O/P EST LOW 20 MIN: CPT

## 2024-09-17 RX ORDER — FLUCONAZOLE 150 MG/1
150 TABLET ORAL ONCE
Qty: 1 TABLET | Refills: 0 | Status: SHIPPED | OUTPATIENT
Start: 2024-09-17 | End: 2024-09-17

## 2024-09-17 RX ORDER — TIRZEPATIDE 2.5 MG/.5ML
1 INJECTION, SOLUTION SUBCUTANEOUS WEEKLY
COMMUNITY
Start: 2024-09-01

## 2024-11-07 DIAGNOSIS — I47.29 NSVT (NONSUSTAINED VENTRICULAR TACHYCARDIA): ICD-10-CM

## 2024-11-08 RX ORDER — METOPROLOL SUCCINATE 25 MG/1
25 TABLET, EXTENDED RELEASE ORAL DAILY
Qty: 30 TABLET | Refills: 2 | Status: SHIPPED | OUTPATIENT
Start: 2024-11-08

## 2024-12-17 ENCOUNTER — OFFICE VISIT (OUTPATIENT)
Dept: OBSTETRICS AND GYNECOLOGY | Facility: CLINIC | Age: 64
End: 2024-12-17
Payer: COMMERCIAL

## 2024-12-17 VITALS
BODY MASS INDEX: 25.73 KG/M2 | SYSTOLIC BLOOD PRESSURE: 116 MMHG | RESPIRATION RATE: 16 BRPM | WEIGHT: 150 LBS | DIASTOLIC BLOOD PRESSURE: 70 MMHG

## 2024-12-17 DIAGNOSIS — Z46.89 ENCOUNTER FOR PESSARY MAINTENANCE: Primary | ICD-10-CM

## 2024-12-17 DIAGNOSIS — N89.8 VAGINAL DISCHARGE: ICD-10-CM

## 2024-12-17 RX ORDER — SOD SULF/POT CHLORIDE/MAG SULF 1.479 G
TABLET ORAL
COMMUNITY
Start: 2024-12-16

## 2024-12-17 NOTE — PROGRESS NOTES
Subjective   Chief Complaint   Patient presents with    Pessary Check     Zuleyma Keatign is a 63 y.o. year old .  No LMP recorded (lmp unknown). Patient is postmenopausal.  She presents to be seen because of pessary follow up. She had restarted using her pessary ~ 3 months ago and reports improvement in prolapse symptoms. She has been able to remove and reinsert her pessary herself- she does report at times it feels like it is not in quite right but overall she has done well with it.     OTHER THINGS SHE WANTS TO DISCUSS TODAY:  She reports an increase in thick white discharge that she has not had before- not even when she previously used her pessary. She denies any itching or burning  or discomfort. She was on an oral antibiotic (doxycycline) for an eye issue (sty) and is still on daily dosing for that.     The following portions of the patient's history were reviewed and updated as appropriate:current medications, allergies, past medical history, and past social history    Social History    Tobacco Use      Smoking status: Never        Passive exposure: Never      Smokeless tobacco: Never      Review of Systems   Genitourinary:  Positive for vaginal discharge.           Objective   /70   Resp 16   Wt 68 kg (150 lb)   LMP  (LMP Unknown)   BMI 25.73 kg/m²     Physical Exam  Vitals and nursing note reviewed. Exam conducted with a chaperone present.   Genitourinary:     General: Normal vulva.      Exam position: Lithotomy position.      Vagina: Vaginal discharge and prolapsed vaginal walls present.      Comments: Grade 3 cystocele, Grade 2 uterine prolapse noted  Psychiatric:         Mood and Affect: Mood normal.         Behavior: Behavior normal.         Thought Content: Thought content normal.         Judgment: Judgment normal.         Lab Review   No data reviewed    Imaging   No data reviewed        Assessment & Plan   Diagnoses and all orders for this visit:    1. Encounter for pessary  maintenance (Primary)    2. Vaginal discharge  -     OneSwab - Kit, Vagina; Future    No obvious signs of discharge or vaginitis; will send OneSwab and communicate results when available.  Patient would like to reconsider surgical management. Offered appointment with Dr Walker whenever she would like to discuss options. She is appreciative and will reach out when she is ready.     The importance of keeping all planned follow-up and taking all medications as prescribed was emphasized.    Return in about 8 months (around 8/27/2025) for Annual physical.    No orders of the defined types were placed in this encounter.                This note was electronically signed.    Ritika Crowder, APRN  December 17, 2024

## 2024-12-24 RX ORDER — TERCONAZOLE 8 MG/G
1 CREAM VAGINAL NIGHTLY
Qty: 3 EACH | Refills: 0 | Status: SHIPPED | OUTPATIENT
Start: 2024-12-24 | End: 2024-12-27

## 2025-01-22 RX ORDER — TERCONAZOLE 4 MG/G
1 CREAM VAGINAL NIGHTLY
Qty: 45 G | Refills: 0 | Status: SHIPPED | OUTPATIENT
Start: 2025-01-22

## 2025-02-02 DIAGNOSIS — I47.29 NSVT (NONSUSTAINED VENTRICULAR TACHYCARDIA): ICD-10-CM

## 2025-02-03 DIAGNOSIS — I47.29 NSVT (NONSUSTAINED VENTRICULAR TACHYCARDIA): ICD-10-CM

## 2025-02-03 RX ORDER — METOPROLOL SUCCINATE 25 MG/1
25 TABLET, EXTENDED RELEASE ORAL DAILY
Qty: 90 TABLET | Refills: 0 | Status: SHIPPED | OUTPATIENT
Start: 2025-02-03

## 2025-02-03 RX ORDER — METOPROLOL SUCCINATE 25 MG/1
25 TABLET, EXTENDED RELEASE ORAL DAILY
Qty: 90 TABLET | Refills: 0 | Status: SHIPPED | OUTPATIENT
Start: 2025-02-03 | End: 2025-02-03

## 2025-02-24 ENCOUNTER — HOSPITAL ENCOUNTER (OUTPATIENT)
Dept: MAMMOGRAPHY | Facility: HOSPITAL | Age: 65
Discharge: HOME | End: 2025-02-24
Admitting: OBSTETRICS & GYNECOLOGY
Payer: COMMERCIAL

## 2025-02-24 DIAGNOSIS — Z12.31 SCREENING MAMMOGRAM FOR BREAST CANCER: ICD-10-CM

## 2025-02-24 LAB
NCCN CRITERIA FLAG: ABNORMAL
TYRER CUZICK SCORE: 11.6

## 2025-02-24 PROCEDURE — 77067 SCR MAMMO BI INCL CAD: CPT

## 2025-02-24 PROCEDURE — 77063 BREAST TOMOSYNTHESIS BI: CPT

## 2025-02-25 PROCEDURE — 77067 SCR MAMMO BI INCL CAD: CPT | Performed by: RADIOLOGY

## 2025-02-25 PROCEDURE — 77063 BREAST TOMOSYNTHESIS BI: CPT | Performed by: RADIOLOGY

## 2025-02-28 ENCOUNTER — DOCUMENTATION (OUTPATIENT)
Dept: GENETICS | Facility: HOSPITAL | Age: 65
End: 2025-02-28
Payer: COMMERCIAL

## 2025-03-20 RX ORDER — FLUCONAZOLE 150 MG/1
150 TABLET ORAL
Qty: 1 TABLET | Refills: 0 | OUTPATIENT
Start: 2025-03-20

## 2025-03-20 NOTE — TELEPHONE ENCOUNTER
Patient has been called and states that she had selected the wrong office and medication to be refilled.

## 2025-03-21 ENCOUNTER — TELEPHONE (OUTPATIENT)
Dept: OBSTETRICS AND GYNECOLOGY | Facility: CLINIC | Age: 65
End: 2025-03-21
Payer: COMMERCIAL

## 2025-03-21 DIAGNOSIS — Z91.89 INCREASED RISK OF BREAST CANCER: Primary | ICD-10-CM

## 2025-03-21 NOTE — TELEPHONE ENCOUNTER
Orders Placed This Encounter   Procedures    MRI Breast Bilateral Screening With & Without Contrast     Standing Status:   Future     Expected Date:   3/21/2025     Expiration Date:   9/17/2025     Release to patient:   Routine Release [[id number]]     Reason for Exam::   increased risk for breast cancer     Patient can be informed the order has been placed.  She should be hearing from the breast imaging center to schedule.    Thanks, Lucía Walker MD

## 2025-03-21 NOTE — TELEPHONE ENCOUNTER
Caller: Zuleyma Keating    Relationship: Self    Best call back number: [phone] (home)       What orders are you requesting (i.e. lab or imaging): [facility]NU[facility] BREAST MRI    In what timeframe would the patient need to come in: AFTER 04/04/25    Where will you receive your lab/imaging services: [facility]

## 2025-04-24 ENCOUNTER — TELEPHONE (OUTPATIENT)
Dept: OBSTETRICS AND GYNECOLOGY | Facility: CLINIC | Age: 65
End: 2025-04-24
Payer: COMMERCIAL

## 2025-04-24 NOTE — TELEPHONE ENCOUNTER
When called previously, there is only one person who schedules the Breast MRIs, Kori Alvares. They had the pt information and notified me that it can take her time to make these appts. Do you want me to reach out to them again?

## 2025-04-24 NOTE — TELEPHONE ENCOUNTER
Caller: Zuleyma Keating    Relationship: Self    Best call back number: [phone]        Who are you requesting to speak with (clinical staff, DR. UNDERWOOD        What was the call regarding: PATIENT ADVISED THAT SHE HAS NOT HEARD FROM [facility] REGARDING SCHEDULING OF THE BREAST MRI, PLEASE ASSIST.

## 2025-04-25 NOTE — TELEPHONE ENCOUNTER
[phone] Kori Alvares. Kori is out of the office today. I have left her a voicemail with pt information and our information and have asked for a return call.     Pt informed and stated understanding and has been given Kori's number again. She demonstrates great appreciation for the assistance. I have notified her that I will continue to follow up on this concern until we have heard back about her scheduling.

## 2025-04-29 NOTE — TELEPHONE ENCOUNTER
Kori from MRI called to let you know she has reached out to Zuleyma Keating twice but has not been able to get a hold of her. Zuleyma called into the [facility] but they were unable to transfer call to her either     Left message for pt to return call.

## 2025-05-14 ENCOUNTER — HOSPITAL ENCOUNTER (OUTPATIENT)
Dept: MRI IMAGING | Facility: HOSPITAL | Age: 65
Discharge: HOME | End: 2025-05-14
Admitting: OBSTETRICS & GYNECOLOGY
Payer: COMMERCIAL

## 2025-05-14 DIAGNOSIS — Z91.89 INCREASED RISK OF BREAST CANCER: ICD-10-CM

## 2025-05-14 PROCEDURE — 25510000002 GADOBENATE DIMEGLUMINE 529 MG/ML SOLUTION: Performed by: OBSTETRICS & GYNECOLOGY

## 2025-05-14 PROCEDURE — A9577 INJ MULTIHANCE: HCPCS | Performed by: OBSTETRICS & GYNECOLOGY

## 2025-05-14 PROCEDURE — 77049 MRI BREAST C-+ W/CAD BI: CPT

## 2025-05-14 RX ADMIN — GADOBENATE DIMEGLUMINE 13 ML: 529 INJECTION, SOLUTION INTRAVENOUS at 12:02

## (undated) DEVICE — NDL SPINE 22G 31/2IN BLK

## (undated) DEVICE — TRY L/P SFTY A/20G